# Patient Record
Sex: FEMALE | Race: BLACK OR AFRICAN AMERICAN | NOT HISPANIC OR LATINO | Employment: OTHER | URBAN - METROPOLITAN AREA
[De-identification: names, ages, dates, MRNs, and addresses within clinical notes are randomized per-mention and may not be internally consistent; named-entity substitution may affect disease eponyms.]

---

## 2023-12-29 ENCOUNTER — APPOINTMENT (OUTPATIENT)
Dept: RADIOLOGY | Facility: HOSPITAL | Age: 78
End: 2023-12-29
Payer: MEDICARE

## 2023-12-29 ENCOUNTER — HOSPITAL ENCOUNTER (EMERGENCY)
Facility: HOSPITAL | Age: 78
Discharge: HOME | End: 2023-12-29
Attending: EMERGENCY MEDICINE
Payer: MEDICARE

## 2023-12-29 ENCOUNTER — APPOINTMENT (OUTPATIENT)
Dept: CARDIOLOGY | Facility: HOSPITAL | Age: 78
End: 2023-12-29
Payer: MEDICARE

## 2023-12-29 VITALS
HEART RATE: 77 BPM | DIASTOLIC BLOOD PRESSURE: 62 MMHG | SYSTOLIC BLOOD PRESSURE: 109 MMHG | RESPIRATION RATE: 16 BRPM | OXYGEN SATURATION: 98 %

## 2023-12-29 DIAGNOSIS — R41.82 AMS (ALTERED MENTAL STATUS): Primary | ICD-10-CM

## 2023-12-29 DIAGNOSIS — R41.0 CONFUSION: ICD-10-CM

## 2023-12-29 LAB
ALBUMIN SERPL BCP-MCNC: 3.7 G/DL (ref 3.4–5)
ALP SERPL-CCNC: 79 U/L (ref 33–136)
ALT SERPL W P-5'-P-CCNC: 7 U/L (ref 7–45)
ANION GAP SERPL CALC-SCNC: 11 MMOL/L (ref 10–20)
AST SERPL W P-5'-P-CCNC: 17 U/L (ref 9–39)
ATRIAL RATE: 83 BPM
BASOPHILS # BLD AUTO: 0.01 X10*3/UL (ref 0–0.1)
BASOPHILS NFR BLD AUTO: 0.2 %
BILIRUB DIRECT SERPL-MCNC: 0.1 MG/DL (ref 0–0.3)
BILIRUB SERPL-MCNC: 0.5 MG/DL (ref 0–1.2)
BUN SERPL-MCNC: 19 MG/DL (ref 6–23)
CALCIUM SERPL-MCNC: 8.5 MG/DL (ref 8.6–10.3)
CHLORIDE SERPL-SCNC: 103 MMOL/L (ref 98–107)
CO2 SERPL-SCNC: 28 MMOL/L (ref 21–32)
CREAT SERPL-MCNC: 1.01 MG/DL (ref 0.5–1.05)
EOSINOPHIL # BLD AUTO: 0 X10*3/UL (ref 0–0.4)
EOSINOPHIL NFR BLD AUTO: 0 %
ERYTHROCYTE [DISTWIDTH] IN BLOOD BY AUTOMATED COUNT: 15.3 % (ref 11.5–14.5)
GFR SERPL CREATININE-BSD FRML MDRD: 57 ML/MIN/1.73M*2
GLUCOSE BLD MANUAL STRIP-MCNC: 107 MG/DL (ref 74–99)
GLUCOSE SERPL-MCNC: 107 MG/DL (ref 74–99)
HCT VFR BLD AUTO: 39.7 % (ref 36–46)
HGB BLD-MCNC: 12.6 G/DL (ref 12–16)
IMM GRANULOCYTES # BLD AUTO: 0.02 X10*3/UL (ref 0–0.5)
IMM GRANULOCYTES NFR BLD AUTO: 0.4 % (ref 0–0.9)
INR PPP: 1.1 (ref 0.9–1.1)
LYMPHOCYTES # BLD AUTO: 0.52 X10*3/UL (ref 0.8–3)
LYMPHOCYTES NFR BLD AUTO: 10 %
MCH RBC QN AUTO: 27 PG (ref 26–34)
MCHC RBC AUTO-ENTMCNC: 31.7 G/DL (ref 32–36)
MCV RBC AUTO: 85 FL (ref 80–100)
MONOCYTES # BLD AUTO: 0.7 X10*3/UL (ref 0.05–0.8)
MONOCYTES NFR BLD AUTO: 13.5 %
NEUTROPHILS # BLD AUTO: 3.94 X10*3/UL (ref 1.6–5.5)
NEUTROPHILS NFR BLD AUTO: 75.9 %
NRBC BLD-RTO: 0 /100 WBCS (ref 0–0)
P AXIS: 47 DEGREES
P OFFSET: 176 MS
P ONSET: 132 MS
PLATELET # BLD AUTO: 185 X10*3/UL (ref 150–450)
POTASSIUM SERPL-SCNC: 3.7 MMOL/L (ref 3.5–5.3)
PR INTERVAL: 174 MS
PROT SERPL-MCNC: 6.8 G/DL (ref 6.4–8.2)
PROTHROMBIN TIME: 12 SECONDS (ref 9.8–12.8)
Q ONSET: 219 MS
QRS COUNT: 14 BEATS
QRS DURATION: 72 MS
QT INTERVAL: 366 MS
QTC CALCULATION(BAZETT): 430 MS
QTC FREDERICIA: 408 MS
R AXIS: 20 DEGREES
RBC # BLD AUTO: 4.66 X10*6/UL (ref 4–5.2)
SODIUM SERPL-SCNC: 138 MMOL/L (ref 136–145)
T AXIS: 27 DEGREES
T OFFSET: 402 MS
VENTRICULAR RATE: 83 BPM
WBC # BLD AUTO: 5.2 X10*3/UL (ref 4.4–11.3)

## 2023-12-29 PROCEDURE — 80053 COMPREHEN METABOLIC PANEL: CPT | Performed by: EMERGENCY MEDICINE

## 2023-12-29 PROCEDURE — 82248 BILIRUBIN DIRECT: CPT | Performed by: EMERGENCY MEDICINE

## 2023-12-29 PROCEDURE — 82947 ASSAY GLUCOSE BLOOD QUANT: CPT | Mod: 59

## 2023-12-29 PROCEDURE — 99284 EMERGENCY DEPT VISIT MOD MDM: CPT | Performed by: EMERGENCY MEDICINE

## 2023-12-29 PROCEDURE — 70450 CT HEAD/BRAIN W/O DYE: CPT | Performed by: RADIOLOGY

## 2023-12-29 PROCEDURE — 70450 CT HEAD/BRAIN W/O DYE: CPT

## 2023-12-29 PROCEDURE — 85610 PROTHROMBIN TIME: CPT | Performed by: EMERGENCY MEDICINE

## 2023-12-29 PROCEDURE — 36415 COLL VENOUS BLD VENIPUNCTURE: CPT | Performed by: EMERGENCY MEDICINE

## 2023-12-29 PROCEDURE — 99285 EMERGENCY DEPT VISIT HI MDM: CPT | Mod: 25

## 2023-12-29 PROCEDURE — 93005 ELECTROCARDIOGRAM TRACING: CPT

## 2023-12-29 PROCEDURE — 85025 COMPLETE CBC W/AUTO DIFF WBC: CPT | Performed by: EMERGENCY MEDICINE

## 2023-12-29 ASSESSMENT — COLUMBIA-SUICIDE SEVERITY RATING SCALE - C-SSRS
6. HAVE YOU EVER DONE ANYTHING, STARTED TO DO ANYTHING, OR PREPARED TO DO ANYTHING TO END YOUR LIFE?: NO
1. IN THE PAST MONTH, HAVE YOU WISHED YOU WERE DEAD OR WISHED YOU COULD GO TO SLEEP AND NOT WAKE UP?: NO
2. HAVE YOU ACTUALLY HAD ANY THOUGHTS OF KILLING YOURSELF?: NO

## 2023-12-29 NOTE — ED TRIAGE NOTES
"Pt stay at a SNF, staffed noticed \"increased tremors.\" EMS noticed patient was confused and had difficulty following commands. Staff at SNF was unable to conclude a baseline mental status. EMS called stroke alert, last known well is unknown. No tremors noted upon arrival to ED.  "

## 2023-12-29 NOTE — ED PROVIDER NOTES
Chief Complaint   Patient presents with    confusion, tremors     History of Present Illness   Elizabeth Garcia   MRN [id number]    78-year-old presents for evaluation of increased tremors per EMS report.  Transported from [facility].  EMS reports that they received secondary handoff from initial responding EMS crew and did not speak with SNF staff however patient was noted to have increased tremors and crew could not rule out a stroke because patient did not participate fully in examination during transport.    Per staff at [facility], patient is oriented at baseline to person and staff was concerned that she appeared more fatigued than usual.    Patient reported no pain or other concerns.    Physical Exam     ED Triage Vitals   Temp Pulse Resp BP   -- -- -- --      SpO2 Temp src Heart Rate Source Patient Position   -- -- -- --      BP Location FiO2 (%)     -- --         General:  No acute distress.  Head: Atraumatic.  Eyes: No conjunctival injection.   Ears Nose Throat: Hearing grossly intact. No epistaxis. Oral mucosa moist.  Neck: Supple.  Cardiovascular: Extremities warm.   Pulmonary: No stridor. Normal respiratory effort.  Abdominal: No distention.  Soft and nontender.  Musculoskeletal: No deformity or joint swelling.  Skin: No rash.  Psychiatric: Does not appear to respond to internal stimuli.  Neurologic: Alert. Follows directions. Face symmetric. No aphasia or dysarthria. Symmetric movement of upper and lower extremities.    Interval: Baseline  Time: 2:36 PM  Person Administering Scale: Praful Brock MD     1a  Level of consciousness: 0=alert; keenly responsive   1b. LOC questions:  2=Performs neither task correctly   1c. LOC commands: 0=Performs both tasks correctly   2.  Best Gaze: 0=normal   3. Visual: 0=No visual loss   4. Facial Palsy: 0=Normal symmetric movement   5a. Motor left arm: 0=No drift, limb holds 90 (or 45) degrees for full 10 seconds   5b.  Motor right arm: 0=No drift, limb holds 90 (or 45) degrees for full  10 seconds   6a. motor left le=No drift, limb holds 90 (or 45) degrees for full 10 seconds   6b  Motor right le=No drift, limb holds 90 (or 45) degrees for full 10 seconds   7. Limb Ataxia: 0=Absent   8.  Sensory: 0=Normal; no sensory loss   9. Best Language:  0=No aphasia, normal   10. Dysarthria: 0=Normal   11. Extinction and Inattention: 0=No abnormality   12. Distal motor function: 0=Normal     Total:   2         ED Course & Medical Decision Making   Vital signs are reassuring.  Patient was alert and appeared comfortable and reported no complaints.  There was initial report from EMS of increased tremor however on initial assessment and on repeat assessments patient had no apparent tremor.  Patient's son arrived at bedside and confirmed that she was in her usual state of health and at her cognitive baseline.  CBC demonstrated no leukocytosis, anemia, or thrombocytopenia.  Metabolic panel and LFTs were unremarkable.  Patient and her son requested only that she be given something to eat and return to her facility.  Presentation not suggestive of acute ischemic stroke or acute alteration of mental status from patient's cognitive baseline.  Discharged in good condition.    ED Course as of 23 1436   Fri Dec 29, 2023   1026 ECG 12 lead  EKG interpreted by me.  2023 at 0912.  Sinus rhythm 83 bpm.   QRS 72 QTc 430.  No ST elevation. [MC]      ED Course User Index  [MC] Praful Brock MD         Diagnoses as of 23 143   Confusion            Praful Brock MD  23 143

## 2024-02-06 ENCOUNTER — APPOINTMENT (OUTPATIENT)
Dept: RADIOLOGY | Facility: HOSPITAL | Age: 79
End: 2024-02-06
Payer: MEDICARE

## 2024-02-06 ENCOUNTER — HOSPITAL ENCOUNTER (EMERGENCY)
Facility: HOSPITAL | Age: 79
Discharge: HOME | End: 2024-02-06
Attending: EMERGENCY MEDICINE
Payer: MEDICARE

## 2024-02-06 ENCOUNTER — APPOINTMENT (OUTPATIENT)
Dept: CARDIOLOGY | Facility: HOSPITAL | Age: 79
End: 2024-02-06
Payer: MEDICARE

## 2024-02-06 VITALS
SYSTOLIC BLOOD PRESSURE: 109 MMHG | HEART RATE: 62 BPM | RESPIRATION RATE: 18 BRPM | OXYGEN SATURATION: 100 % | DIASTOLIC BLOOD PRESSURE: 63 MMHG

## 2024-02-06 DIAGNOSIS — Z86.59 MENTAL STATUS CHANGE RESOLVED: ICD-10-CM

## 2024-02-06 DIAGNOSIS — N30.00 ACUTE CYSTITIS WITHOUT HEMATURIA: Primary | ICD-10-CM

## 2024-02-06 LAB
ALBUMIN SERPL BCP-MCNC: 3.2 G/DL (ref 3.4–5)
ALP SERPL-CCNC: 67 U/L (ref 33–136)
ALT SERPL W P-5'-P-CCNC: 5 U/L (ref 7–45)
AMPHETAMINES UR QL SCN: NORMAL
ANION GAP SERPL CALC-SCNC: 11 MMOL/L (ref 10–20)
APPEARANCE UR: CLEAR
APTT PPP: 31 SECONDS (ref 27–38)
AST SERPL W P-5'-P-CCNC: 13 U/L (ref 9–39)
BACTERIA #/AREA URNS AUTO: ABNORMAL /HPF
BARBITURATES UR QL SCN: NORMAL
BASOPHILS # BLD AUTO: 0.01 X10*3/UL (ref 0–0.1)
BASOPHILS NFR BLD AUTO: 0.2 %
BENZODIAZ UR QL SCN: NORMAL
BILIRUB SERPL-MCNC: 0.3 MG/DL (ref 0–1.2)
BILIRUB UR STRIP.AUTO-MCNC: NEGATIVE MG/DL
BUN SERPL-MCNC: 23 MG/DL (ref 6–23)
BZE UR QL SCN: NORMAL
CALCIUM SERPL-MCNC: 8.6 MG/DL (ref 8.6–10.3)
CANNABINOIDS UR QL SCN: NORMAL
CARDIAC TROPONIN I PNL SERPL HS: 4 NG/L (ref 0–13)
CHLORIDE SERPL-SCNC: 104 MMOL/L (ref 98–107)
CO2 SERPL-SCNC: 25 MMOL/L (ref 21–32)
COLOR UR: ABNORMAL
CREAT SERPL-MCNC: 0.78 MG/DL (ref 0.5–1.05)
EGFRCR SERPLBLD CKD-EPI 2021: 78 ML/MIN/1.73M*2
EOSINOPHIL # BLD AUTO: 0.08 X10*3/UL (ref 0–0.4)
EOSINOPHIL NFR BLD AUTO: 1.3 %
ERYTHROCYTE [DISTWIDTH] IN BLOOD BY AUTOMATED COUNT: 15.5 % (ref 11.5–14.5)
ETHANOL SERPL-MCNC: <10 MG/DL
FENTANYL+NORFENTANYL UR QL SCN: NORMAL
GLUCOSE BLD MANUAL STRIP-MCNC: 109 MG/DL (ref 74–99)
GLUCOSE SERPL-MCNC: 111 MG/DL (ref 74–99)
GLUCOSE UR STRIP.AUTO-MCNC: NEGATIVE MG/DL
HCT VFR BLD AUTO: 35.8 % (ref 36–46)
HGB BLD-MCNC: 11.1 G/DL (ref 12–16)
HOLD SPECIMEN: NORMAL
IMM GRANULOCYTES # BLD AUTO: 0.02 X10*3/UL (ref 0–0.5)
IMM GRANULOCYTES NFR BLD AUTO: 0.3 % (ref 0–0.9)
INR PPP: 1.1 (ref 0.9–1.1)
KETONES UR STRIP.AUTO-MCNC: NEGATIVE MG/DL
LEUKOCYTE ESTERASE UR QL STRIP.AUTO: ABNORMAL
LYMPHOCYTES # BLD AUTO: 1.58 X10*3/UL (ref 0.8–3)
LYMPHOCYTES NFR BLD AUTO: 25.8 %
MCH RBC QN AUTO: 26.8 PG (ref 26–34)
MCHC RBC AUTO-ENTMCNC: 31 G/DL (ref 32–36)
MCV RBC AUTO: 87 FL (ref 80–100)
MONOCYTES # BLD AUTO: 0.47 X10*3/UL (ref 0.05–0.8)
MONOCYTES NFR BLD AUTO: 7.7 %
MUCOUS THREADS #/AREA URNS AUTO: ABNORMAL /LPF
NEUTROPHILS # BLD AUTO: 3.96 X10*3/UL (ref 1.6–5.5)
NEUTROPHILS NFR BLD AUTO: 64.7 %
NITRITE UR QL STRIP.AUTO: POSITIVE
NRBC BLD-RTO: 0 /100 WBCS (ref 0–0)
OPIATES UR QL SCN: NORMAL
OXYCODONE+OXYMORPHONE UR QL SCN: NORMAL
PCP UR QL SCN: NORMAL
PH UR STRIP.AUTO: 7 [PH]
PLATELET # BLD AUTO: 190 X10*3/UL (ref 150–450)
POTASSIUM SERPL-SCNC: 3.9 MMOL/L (ref 3.5–5.3)
PROT SERPL-MCNC: 6.3 G/DL (ref 6.4–8.2)
PROT UR STRIP.AUTO-MCNC: NEGATIVE MG/DL
PROTHROMBIN TIME: 11.9 SECONDS (ref 9.8–12.8)
RBC # BLD AUTO: 4.14 X10*6/UL (ref 4–5.2)
RBC # UR STRIP.AUTO: NEGATIVE /UL
RBC #/AREA URNS AUTO: ABNORMAL /HPF
SODIUM SERPL-SCNC: 136 MMOL/L (ref 136–145)
SP GR UR STRIP.AUTO: 1.04
SQUAMOUS #/AREA URNS AUTO: ABNORMAL /HPF
UROBILINOGEN UR STRIP.AUTO-MCNC: <2 MG/DL
WBC # BLD AUTO: 6.1 X10*3/UL (ref 4.4–11.3)
WBC #/AREA URNS AUTO: ABNORMAL /HPF

## 2024-02-06 PROCEDURE — 80307 DRUG TEST PRSMV CHEM ANLYZR: CPT | Performed by: EMERGENCY MEDICINE

## 2024-02-06 PROCEDURE — 70498 CT ANGIOGRAPHY NECK: CPT | Performed by: RADIOLOGY

## 2024-02-06 PROCEDURE — 36415 COLL VENOUS BLD VENIPUNCTURE: CPT | Performed by: EMERGENCY MEDICINE

## 2024-02-06 PROCEDURE — 70496 CT ANGIOGRAPHY HEAD: CPT

## 2024-02-06 PROCEDURE — 2500000004 HC RX 250 GENERAL PHARMACY W/ HCPCS (ALT 636 FOR OP/ED): Performed by: EMERGENCY MEDICINE

## 2024-02-06 PROCEDURE — 82077 ASSAY SPEC XCP UR&BREATH IA: CPT | Performed by: EMERGENCY MEDICINE

## 2024-02-06 PROCEDURE — 84484 ASSAY OF TROPONIN QUANT: CPT | Performed by: EMERGENCY MEDICINE

## 2024-02-06 PROCEDURE — 85025 COMPLETE CBC W/AUTO DIFF WBC: CPT | Performed by: EMERGENCY MEDICINE

## 2024-02-06 PROCEDURE — 71045 X-RAY EXAM CHEST 1 VIEW: CPT

## 2024-02-06 PROCEDURE — 2550000001 HC RX 255 CONTRASTS: Performed by: EMERGENCY MEDICINE

## 2024-02-06 PROCEDURE — 81001 URINALYSIS AUTO W/SCOPE: CPT | Mod: 59 | Performed by: EMERGENCY MEDICINE

## 2024-02-06 PROCEDURE — 93005 ELECTROCARDIOGRAM TRACING: CPT

## 2024-02-06 PROCEDURE — 70498 CT ANGIOGRAPHY NECK: CPT

## 2024-02-06 PROCEDURE — 70496 CT ANGIOGRAPHY HEAD: CPT | Performed by: RADIOLOGY

## 2024-02-06 PROCEDURE — 80053 COMPREHEN METABOLIC PANEL: CPT | Performed by: EMERGENCY MEDICINE

## 2024-02-06 PROCEDURE — 85610 PROTHROMBIN TIME: CPT | Performed by: EMERGENCY MEDICINE

## 2024-02-06 PROCEDURE — 99285 EMERGENCY DEPT VISIT HI MDM: CPT | Mod: 25 | Performed by: EMERGENCY MEDICINE

## 2024-02-06 PROCEDURE — 82947 ASSAY GLUCOSE BLOOD QUANT: CPT | Mod: 59

## 2024-02-06 PROCEDURE — 87086 URINE CULTURE/COLONY COUNT: CPT | Mod: AHULAB | Performed by: EMERGENCY MEDICINE

## 2024-02-06 PROCEDURE — 70450 CT HEAD/BRAIN W/O DYE: CPT | Performed by: RADIOLOGY

## 2024-02-06 PROCEDURE — 70450 CT HEAD/BRAIN W/O DYE: CPT | Mod: 59

## 2024-02-06 PROCEDURE — 71045 X-RAY EXAM CHEST 1 VIEW: CPT | Mod: FOREIGN READ | Performed by: RADIOLOGY

## 2024-02-06 PROCEDURE — 85730 THROMBOPLASTIN TIME PARTIAL: CPT | Performed by: EMERGENCY MEDICINE

## 2024-02-06 RX ORDER — SULFAMETHOXAZOLE AND TRIMETHOPRIM 800; 160 MG/1; MG/1
1 TABLET ORAL ONCE
Status: COMPLETED | OUTPATIENT
Start: 2024-02-06 | End: 2024-02-06

## 2024-02-06 RX ORDER — SULFAMETHOXAZOLE AND TRIMETHOPRIM 800; 160 MG/1; MG/1
1 TABLET ORAL 2 TIMES DAILY
Qty: 14 TABLET | Refills: 0 | Status: SHIPPED | OUTPATIENT
Start: 2024-02-06 | End: 2024-02-13

## 2024-02-06 RX ADMIN — IOHEXOL 75 ML: 350 INJECTION, SOLUTION INTRAVENOUS at 08:42

## 2024-02-06 RX ADMIN — SULFAMETHOXAZOLE AND TRIMETHOPRIM 1 TABLET: 800; 160 TABLET ORAL at 11:49

## 2024-02-06 ASSESSMENT — ACTIVITIES OF DAILY LIVING (ADL): LACK_OF_TRANSPORTATION: NO

## 2024-02-06 NOTE — PROGRESS NOTES
02/06/24 0843   [facility] Disability Status   Are you deaf or do you have serious difficulty hearing? N   Are you blind or do you have serious difficulty seeing, even when wearing glasses? N   Because of a physical, mental, or emotional condition, do you have serious difficulty concentrating, remembering, or making decisions? (5 years old or older) Y  (dementia)   Do you have serious difficulty walking or climbing stairs? Y   Do you have serious difficulty dressing or bathing? Y   Because of a physical, mental, or emotional condition, do you have serious difficulty doing errands alone such as visiting the doctor? Y

## 2024-02-06 NOTE — PROGRESS NOTES
Transitional Care Coordination Progress Note:  Plan per Medical/Surgical team: treatment of change in mental status with CTSCANs pending  Status: ED  Payor source: Erin Hopkinsregor care coordinator Christal Laura [phone], fax #[phone]    [facility]charge disposition: [facility]  [address] [facility] [id number] [phone]  Potential Barriers: dementia  ADOD: 2/6/2024  JOSE Luo RN, BSN Transitional Care Coordinator ED# [phone]      02/06/24 0844   Discharge Planning   Living Arrangements Alone   Support Systems Children   Assistance Needed CTSCANs of brain pending   Type of Residence Nursing home/residential care   Do you have animals or pets at home? No   Home or Post Acute Services Post acute facilities (Rehab/SNF/etc)   Type of Post Acute Facility Services Long term care   Patient [facility] to be discharged to: [facility]  [address] [facility] [id number] [phone]   Does the patient need discharge transport arranged? Yes   RoundTrip coordination needed? Yes   Has discharge transport been arranged? No   Financial Resource Strain   How hard is it for you to pay for the very basics like food, housing, medical care, and heating? Not hard   Housing Stability   In the last 12 months, was there a time when you were not able to pay the mortgage or rent on time? N   In the last 12 months, how many places have you lived? 2   In the last 12 months, was there a time when you did not have a steady place to sleep or slept in a shelter (including now)? N   Transportation Needs   In the past 12 months, has lack of transportation kept you from medical appointments or from getting medications? no   In the past 12 months, has lack of transportation kept you from meetings, work, or from getting things needed for daily living? No   Patient Choice   Provider Choice list and CMS website (https://medicare.gov/care-compare#search) for  post-acute Quality and Resource Measure Data were provided and reviewed with: Patient;Family   Patient / Family choosing to utilize agency / facility established prior to hospitalization Yes

## 2024-02-06 NOTE — ED PROVIDER NOTES
T    Time initially seen in ED: 0805      History/Exam limitations: mental status.   Additional history was obtained from relative(s).    HPI:       Elizabeth Garcia is a 78 y.o. with a history of dementia.  78-year-old, dementia unit, son gives majority of history.  Around 730 last known well, apparently screamed out, then had more difficulty verbalizing and communicating.  [place] stroke L- by EMS brought to ED for evaluation.  No lateralizing findings, just not speaking as well or following complete commands.  Son believes may be related to possibly hangover, she did have some wine last night with him at the facility.  Patient had no particular complaints otherwise.  Again no new lateralizing issues.  No injury or trauma.    Besides potentially mild dementia, but son states she has no other past medical problems does not take any prescription medications.     Last Known Well Time: 0730        ------------------------------------------------------------------------------------------------------------------------------------------     Physical Exam:    ED Triage Vitals   Temp Pulse Resp BP   -- -- -- --      SpO2 Temp src Heart Rate Source Patient Position   -- -- -- --      BP Location FiO2 (%)     -- --          Gen: Not in acute distress  Head/Neck: NCAT  Eyes: Anicteric sclerae, noninjected conjunctivae  Nose: No rhinorrhea  Mouth:  MMM  Heart: Regular rate and rhythm w/ no murmurs, rubs, gallops  Lungs: CTAB w/o wheezes, rales, rhonchi, no increased work of breathing  Abdomen: Soft, NT/ND  Musculoskeletal: No deformities  Extremities: No edema.  Neurologic: Please see below for NIHSS  Skin: No rashes noted  Psychological: Calm        Interval: Baseline  Time: 12:18 PM  Person Administering Scale: eHrnando Morataya MD MPH     1a  Level of consciousness: 1=not alert but arousable by minor stimulation to obey, answer or respond   1b. LOC questions:  1=Performs one task correctly   1c. LOC commands: 0=Performs both  tasks correctly   2.  Best Gaze: 0=normal   3. Visual: 0=No visual loss   4. Facial Palsy: 0=Normal symmetric movement   5a. Motor left arm: 0=No drift, limb holds 90 (or 45) degrees for full 10 seconds   5b.  Motor right arm: 0=No drift, limb holds 90 (or 45) degrees for full 10 seconds   6a. motor left le=No drift, limb holds 90 (or 45) degrees for full 10 seconds   6b  Motor right le=No drift, limb holds 90 (or 45) degrees for full 10 seconds   7. Limb Ataxia: 0=Absent   8.  Sensory: 0=Normal; no sensory loss   9. Best Language:  1=Mild to moderate aphasia; some obvious loss of fluency or facility of comprehension without significant limitation on ideas expressed or form of expression.   10. Dysarthria: 0=Normal   11. Extinction and Inattention: 0=No abnormality   12. Distal motor function: 0=Normal     Total:   1         VAN: VAN: Negative     ------------------------------------------------------------------------------------------------------------------------------------------     Medical Decision Making:     ED Course as of 24      0848 ECG 12 lead  Sinus rhythm.  81 bpm.  Normal rate rhythm and axis.  Moderate to significant artifact but no acute ischemic findings. [KS]      ED Course User Index  [KS] Hernando Morataya MD MPH         Diagnoses as of 24   Acute cystitis without hematuria   Mental status change resolved        EKG interpreted by myself (ED attending physician): See ED course     Independent Interpretation of Studies: I independently interpreted the CT head and see No obvious evidence of intracranial hemorrhage  CT angiograms negative as well.      Patient essentially at baseline according to family members.    Reassessment.  Patient up ambulatory.  Family at bedside states this is her normal self.  They have no concerns about her mental status.    Patient's urine does show UTI small UTI.  Able to tolerate Bactrim p.o. while in the ED.  Will  discharge on Bactrim x 7 days.  Family comfortable taking back to facility at this time.  Again she is at baseline.  Up ambulatory normally conversant for her dementia status.      Chronic Medical Conditions Significantly Affecting Care: Dementia    Social Determinants of Health Significantly Affecting Care:        External Records Reviewed: I reviewed recent and relevant outside records including:      Discussion of Management with Other Providers:   I discussed the patient/results with:       IV Thrombolysis: No thrombolysis.  Working diagnosis not CVA.        Procedure  Procedures          Hernando Morataya MD MPH  02/06/24 5339

## 2024-02-06 NOTE — PROGRESS NOTES
[facility]  [address] [address] [facility] [address]   [phone]     02/06/24 0843   Current Planned Discharge Disposition   Current Planned Discharge Disposition SNF

## 2024-02-07 LAB
ATRIAL RATE: 81 BPM
P AXIS: 43 DEGREES
P OFFSET: 167 MS
P ONSET: 123 MS
PR INTERVAL: 198 MS
Q ONSET: 222 MS
QRS COUNT: 13 BEATS
QRS DURATION: 72 MS
QT INTERVAL: 374 MS
QTC CALCULATION(BAZETT): 434 MS
QTC FREDERICIA: 413 MS
R AXIS: 26 DEGREES
T AXIS: 51 DEGREES
T OFFSET: 409 MS
VENTRICULAR RATE: 81 BPM

## 2024-02-09 LAB — BACTERIA UR CULT: ABNORMAL

## 2024-02-10 ENCOUNTER — TELEPHONE (OUTPATIENT)
Dept: PHARMACY | Facility: HOSPITAL | Age: 79
End: 2024-02-10
Payer: MEDICARE

## 2024-02-10 NOTE — PROGRESS NOTES
EDPD Note: Lab/Chart Reviewed    Reviewed Ms. Elizabeth Garcia 's chart regarding a positive urine culture that was taken during their recent emergency room visit. The patient was transferred back to their rehab/LTC facility .Therefore, I [facility] fax number [phone] .    Susceptibility data from last 90 days.  Collected Specimen Info Organism Amoxicillin/Clavulanate Ampicillin Ampicillin/Sulbactam Cefazolin Cefazolin (uncomplicated UTIs only) Ciprofloxacin Gentamicin Nitrofurantoin Piperacillin/Tazobactam Trimethoprim/Sulfamethoxazole   02/06/24 Urine from Clean Catch/Voided Escherichia coli S R S S S S S S S R       No further follow up needed from EDPD Team.     Christy Sanders, PharmD

## 2024-05-02 ENCOUNTER — HOSPITAL ENCOUNTER (INPATIENT)
Facility: HOSPITAL | Age: 79
LOS: 2 days | Discharge: NURSING, REHAB OR LONG-TERM CARE | DRG: 689 | End: 2024-05-04
Attending: EMERGENCY MEDICINE | Admitting: FAMILY MEDICINE
Payer: MEDICARE

## 2024-05-02 ENCOUNTER — APPOINTMENT (OUTPATIENT)
Dept: RADIOLOGY | Facility: HOSPITAL | Age: 79
DRG: 689 | End: 2024-05-02
Payer: MEDICARE

## 2024-05-02 ENCOUNTER — APPOINTMENT (OUTPATIENT)
Dept: CARDIOLOGY | Facility: HOSPITAL | Age: 79
DRG: 689 | End: 2024-05-02
Payer: MEDICARE

## 2024-05-02 DIAGNOSIS — N30.90 CYSTITIS: ICD-10-CM

## 2024-05-02 DIAGNOSIS — N39.0 URINARY TRACT INFECTION WITHOUT HEMATURIA, SITE UNSPECIFIED: Primary | ICD-10-CM

## 2024-05-02 DIAGNOSIS — R40.4 UNRESPONSIVE EPISODE: ICD-10-CM

## 2024-05-02 LAB
ALBUMIN SERPL BCP-MCNC: 4 G/DL (ref 3.4–5)
ALP SERPL-CCNC: 83 U/L (ref 33–136)
ALT SERPL W P-5'-P-CCNC: 7 U/L (ref 7–45)
ANION GAP SERPL CALC-SCNC: 15 MMOL/L (ref 10–20)
APPEARANCE UR: ABNORMAL
APTT PPP: 32 SECONDS (ref 27–38)
AST SERPL W P-5'-P-CCNC: 15 U/L (ref 9–39)
ATRIAL RATE: 71 BPM
BACTERIA #/AREA URNS AUTO: ABNORMAL /HPF
BASOPHILS # BLD AUTO: 0.01 X10*3/UL (ref 0–0.1)
BASOPHILS NFR BLD AUTO: 0.2 %
BILIRUB SERPL-MCNC: 0.4 MG/DL (ref 0–1.2)
BILIRUB UR STRIP.AUTO-MCNC: NEGATIVE MG/DL
BUN SERPL-MCNC: 26 MG/DL (ref 6–23)
CALCIUM SERPL-MCNC: 8.8 MG/DL (ref 8.6–10.3)
CARDIAC TROPONIN I PNL SERPL HS: 4 NG/L (ref 0–13)
CHLORIDE SERPL-SCNC: 104 MMOL/L (ref 98–107)
CO2 SERPL-SCNC: 24 MMOL/L (ref 21–32)
COLOR UR: ABNORMAL
CREAT SERPL-MCNC: 0.85 MG/DL (ref 0.5–1.05)
EGFRCR SERPLBLD CKD-EPI 2021: 71 ML/MIN/1.73M*2
EOSINOPHIL # BLD AUTO: 0.08 X10*3/UL (ref 0–0.4)
EOSINOPHIL NFR BLD AUTO: 1.9 %
ERYTHROCYTE [DISTWIDTH] IN BLOOD BY AUTOMATED COUNT: 14.5 % (ref 11.5–14.5)
GLUCOSE SERPL-MCNC: 93 MG/DL (ref 74–99)
GLUCOSE UR STRIP.AUTO-MCNC: NORMAL MG/DL
HCT VFR BLD AUTO: 38.9 % (ref 36–46)
HGB BLD-MCNC: 12.4 G/DL (ref 12–16)
IMM GRANULOCYTES # BLD AUTO: 0.01 X10*3/UL (ref 0–0.5)
IMM GRANULOCYTES NFR BLD AUTO: 0.2 % (ref 0–0.9)
INR PPP: 1.1 (ref 0.9–1.1)
KETONES UR STRIP.AUTO-MCNC: ABNORMAL MG/DL
LACTATE SERPL-SCNC: 0.9 MMOL/L (ref 0.4–2)
LACTATE SERPL-SCNC: 3.6 MMOL/L (ref 0.4–2)
LEUKOCYTE ESTERASE UR QL STRIP.AUTO: ABNORMAL
LYMPHOCYTES # BLD AUTO: 1.64 X10*3/UL (ref 0.8–3)
LYMPHOCYTES NFR BLD AUTO: 38.3 %
MAGNESIUM SERPL-MCNC: 2.1 MG/DL (ref 1.6–2.4)
MCH RBC QN AUTO: 27.4 PG (ref 26–34)
MCHC RBC AUTO-ENTMCNC: 31.9 G/DL (ref 32–36)
MCV RBC AUTO: 86 FL (ref 80–100)
MONOCYTES # BLD AUTO: 0.28 X10*3/UL (ref 0.05–0.8)
MONOCYTES NFR BLD AUTO: 6.5 %
MUCOUS THREADS #/AREA URNS AUTO: ABNORMAL /LPF
NEUTROPHILS # BLD AUTO: 2.26 X10*3/UL (ref 1.6–5.5)
NEUTROPHILS NFR BLD AUTO: 52.9 %
NITRITE UR QL STRIP.AUTO: ABNORMAL
NRBC BLD-RTO: 0 /100 WBCS (ref 0–0)
P AXIS: 51 DEGREES
P OFFSET: 170 MS
P ONSET: 116 MS
PH UR STRIP.AUTO: 6.5 [PH]
PLATELET # BLD AUTO: 191 X10*3/UL (ref 150–450)
POTASSIUM SERPL-SCNC: 3.9 MMOL/L (ref 3.5–5.3)
PR INTERVAL: 202 MS
PROT SERPL-MCNC: 7.1 G/DL (ref 6.4–8.2)
PROT UR STRIP.AUTO-MCNC: NEGATIVE MG/DL
PROTHROMBIN TIME: 12 SECONDS (ref 9.8–12.8)
Q ONSET: 217 MS
QRS COUNT: 12 BEATS
QRS DURATION: 80 MS
QT INTERVAL: 406 MS
QTC CALCULATION(BAZETT): 441 MS
QTC FREDERICIA: 429 MS
R AXIS: 30 DEGREES
RBC # BLD AUTO: 4.53 X10*6/UL (ref 4–5.2)
RBC # UR STRIP.AUTO: ABNORMAL /UL
RBC #/AREA URNS AUTO: ABNORMAL /HPF
SODIUM SERPL-SCNC: 139 MMOL/L (ref 136–145)
SP GR UR STRIP.AUTO: 1.02
T AXIS: 69 DEGREES
T OFFSET: 420 MS
T4 FREE SERPL-MCNC: 0.84 NG/DL (ref 0.61–1.12)
TSH SERPL-ACNC: 7.52 MIU/L (ref 0.44–3.98)
UROBILINOGEN UR STRIP.AUTO-MCNC: NORMAL MG/DL
VENTRICULAR RATE: 71 BPM
WBC # BLD AUTO: 4.3 X10*3/UL (ref 4.4–11.3)
WBC #/AREA URNS AUTO: >50 /HPF

## 2024-05-02 PROCEDURE — 71045 X-RAY EXAM CHEST 1 VIEW: CPT

## 2024-05-02 PROCEDURE — 84439 ASSAY OF FREE THYROXINE: CPT

## 2024-05-02 PROCEDURE — 2500000004 HC RX 250 GENERAL PHARMACY W/ HCPCS (ALT 636 FOR OP/ED): Performed by: EMERGENCY MEDICINE

## 2024-05-02 PROCEDURE — 81001 URINALYSIS AUTO W/SCOPE: CPT

## 2024-05-02 PROCEDURE — 36415 COLL VENOUS BLD VENIPUNCTURE: CPT

## 2024-05-02 PROCEDURE — 93005 ELECTROCARDIOGRAM TRACING: CPT

## 2024-05-02 PROCEDURE — 70450 CT HEAD/BRAIN W/O DYE: CPT | Performed by: RADIOLOGY

## 2024-05-02 PROCEDURE — 2500000004 HC RX 250 GENERAL PHARMACY W/ HCPCS (ALT 636 FOR OP/ED)

## 2024-05-02 PROCEDURE — 96365 THER/PROPH/DIAG IV INF INIT: CPT

## 2024-05-02 PROCEDURE — 96361 HYDRATE IV INFUSION ADD-ON: CPT

## 2024-05-02 PROCEDURE — 70450 CT HEAD/BRAIN W/O DYE: CPT

## 2024-05-02 PROCEDURE — 2500000004 HC RX 250 GENERAL PHARMACY W/ HCPCS (ALT 636 FOR OP/ED): Performed by: FAMILY MEDICINE

## 2024-05-02 PROCEDURE — 1200000002 HC GENERAL ROOM WITH TELEMETRY DAILY

## 2024-05-02 PROCEDURE — 84484 ASSAY OF TROPONIN QUANT: CPT

## 2024-05-02 PROCEDURE — 85610 PROTHROMBIN TIME: CPT

## 2024-05-02 PROCEDURE — 83605 ASSAY OF LACTIC ACID: CPT

## 2024-05-02 PROCEDURE — 83735 ASSAY OF MAGNESIUM: CPT

## 2024-05-02 PROCEDURE — 99285 EMERGENCY DEPT VISIT HI MDM: CPT | Mod: 25

## 2024-05-02 PROCEDURE — 84075 ASSAY ALKALINE PHOSPHATASE: CPT

## 2024-05-02 PROCEDURE — 84443 ASSAY THYROID STIM HORMONE: CPT

## 2024-05-02 PROCEDURE — 85025 COMPLETE CBC W/AUTO DIFF WBC: CPT

## 2024-05-02 PROCEDURE — 71045 X-RAY EXAM CHEST 1 VIEW: CPT | Mod: FOREIGN READ | Performed by: RADIOLOGY

## 2024-05-02 RX ORDER — TALC
3 POWDER (GRAM) TOPICAL NIGHTLY PRN
Status: DISCONTINUED | OUTPATIENT
Start: 2024-05-02 | End: 2024-05-04 | Stop reason: HOSPADM

## 2024-05-02 RX ORDER — ACETAMINOPHEN 325 MG/1
650 TABLET ORAL EVERY 4 HOURS PRN
Status: DISCONTINUED | OUTPATIENT
Start: 2024-05-02 | End: 2024-05-04 | Stop reason: HOSPADM

## 2024-05-02 RX ORDER — CEFTRIAXONE 1 G/50ML
1 INJECTION, SOLUTION INTRAVENOUS ONCE
Status: COMPLETED | OUTPATIENT
Start: 2024-05-02 | End: 2024-05-02

## 2024-05-02 RX ORDER — GUAIFENESIN 600 MG/1
600 TABLET, EXTENDED RELEASE ORAL EVERY 12 HOURS PRN
Status: DISCONTINUED | OUTPATIENT
Start: 2024-05-02 | End: 2024-05-04 | Stop reason: HOSPADM

## 2024-05-02 RX ORDER — ENOXAPARIN SODIUM 100 MG/ML
40 INJECTION SUBCUTANEOUS EVERY 24 HOURS
Status: DISCONTINUED | OUTPATIENT
Start: 2024-05-02 | End: 2024-05-04 | Stop reason: HOSPADM

## 2024-05-02 RX ORDER — POLYETHYLENE GLYCOL 3350 17 G/17G
17 POWDER, FOR SOLUTION ORAL DAILY
Status: DISCONTINUED | OUTPATIENT
Start: 2024-05-03 | End: 2024-05-04 | Stop reason: HOSPADM

## 2024-05-02 RX ORDER — ACETAMINOPHEN 650 MG/1
650 SUPPOSITORY RECTAL EVERY 4 HOURS PRN
Status: DISCONTINUED | OUTPATIENT
Start: 2024-05-02 | End: 2024-05-04 | Stop reason: HOSPADM

## 2024-05-02 RX ORDER — ONDANSETRON HYDROCHLORIDE 2 MG/ML
4 INJECTION, SOLUTION INTRAVENOUS EVERY 8 HOURS PRN
Status: DISCONTINUED | OUTPATIENT
Start: 2024-05-02 | End: 2024-05-04 | Stop reason: HOSPADM

## 2024-05-02 RX ORDER — ACETAMINOPHEN 160 MG/5ML
650 SOLUTION ORAL EVERY 4 HOURS PRN
Status: DISCONTINUED | OUTPATIENT
Start: 2024-05-02 | End: 2024-05-04 | Stop reason: HOSPADM

## 2024-05-02 RX ORDER — ONDANSETRON 4 MG/1
4 TABLET, FILM COATED ORAL EVERY 8 HOURS PRN
Status: DISCONTINUED | OUTPATIENT
Start: 2024-05-02 | End: 2024-05-04 | Stop reason: HOSPADM

## 2024-05-02 RX ADMIN — CEFTRIAXONE SODIUM 1 G: 1 INJECTION, SOLUTION INTRAVENOUS at 16:25

## 2024-05-02 RX ADMIN — ENOXAPARIN SODIUM 40 MG: 40 INJECTION SUBCUTANEOUS at 23:46

## 2024-05-02 RX ADMIN — SODIUM CHLORIDE, SODIUM LACTATE, POTASSIUM CHLORIDE, AND CALCIUM CHLORIDE 1000 ML: 600; 310; 30; 20 INJECTION, SOLUTION INTRAVENOUS at 15:30

## 2024-05-02 ASSESSMENT — COLUMBIA-SUICIDE SEVERITY RATING SCALE - C-SSRS
1. IN THE PAST MONTH, HAVE YOU WISHED YOU WERE DEAD OR WISHED YOU COULD GO TO SLEEP AND NOT WAKE UP?: NO
6. HAVE YOU EVER DONE ANYTHING, STARTED TO DO ANYTHING, OR PREPARED TO DO ANYTHING TO END YOUR LIFE?: NO
2. HAVE YOU ACTUALLY HAD ANY THOUGHTS OF KILLING YOURSELF?: NO

## 2024-05-02 ASSESSMENT — COGNITIVE AND FUNCTIONAL STATUS - GENERAL
MOBILITY SCORE: 8
CLIMB 3 TO 5 STEPS WITH RAILING: TOTAL
DRESSING REGULAR LOWER BODY CLOTHING: TOTAL
PERSONAL GROOMING: TOTAL
DAILY ACTIVITIY SCORE: 7
MOVING TO AND FROM BED TO CHAIR: TOTAL
WALKING IN HOSPITAL ROOM: TOTAL
HELP NEEDED FOR BATHING: TOTAL
MOVING FROM LYING ON BACK TO SITTING ON SIDE OF FLAT BED WITH BEDRAILS: A LOT
TOILETING: TOTAL
DRESSING REGULAR UPPER BODY CLOTHING: TOTAL
TURNING FROM BACK TO SIDE WHILE IN FLAT BAD: A LOT
EATING MEALS: A LOT
STANDING UP FROM CHAIR USING ARMS: TOTAL

## 2024-05-02 ASSESSMENT — ACTIVITIES OF DAILY LIVING (ADL): LACK_OF_TRANSPORTATION: NO

## 2024-05-02 NOTE — ED TRIAGE NOTES
Pt arrived to ED with c/o seizure like activity at [facility]) that lasted approximately 10-15 seconds. EMS reports that pt is A+0 x 1 at baseline. Pt was not verbally answering questions upon arrival but began to shortly after. EMS reports that pt had 1 prior complaint that was similar and was dx with UTI at that time.

## 2024-05-02 NOTE — PROGRESS NOTES
Transitional Care Coordination Progress Note:  Plan per Medical/Surgical team: treatment of seizure like activity with CTSCANs pending  Status: ED  Payor source: Erin Hopkinsregor care coordinator Christal Laura [phone], fax #[phone]    Discharge disposition: [facility]  [phone]  Potential Barriers: dementia  ADOD: 5/4/2024  L Pam Luo RN, BSN Transitional Care Coordinator ED# [phone]      05/02/24 1325   Discharge Planning   Living Arrangements Alone   Support Systems Children   Assistance Needed seizure precautions   Type of Residence Nursing home/residential care   Do you have animals or pets at home? No   Home or Post Acute Services Post acute facilities (Rehab/SNF/etc)   Type of Post Acute Facility Services Long term care   Patient expects to be discharged to: [facility] [phone]   Does the patient need discharge transport arranged? Yes   RoundTrip coordination needed? Yes   Has discharge transport been arranged? No   Financial Resource Strain   How hard is it for you to pay for the very basics like food, housing, medical care, and heating? Not hard   Housing Stability   In the last 12 months, was there a time when you were not able to pay the mortgage or rent on time? N   In the last 12 months, how many places have you lived? 1   In the last 12 months, was there a time when you did not have a steady place to sleep or slept in a shelter (including now)? N   Transportation Needs   In the past 12 months, has lack of transportation kept you from medical appointments or from getting medications? no   In the past 12 months, has lack of transportation kept you from meetings, work, or from getting things needed for daily living? No

## 2024-05-02 NOTE — ED PROVIDER NOTES
HPI   Chief Complaint   Patient presents with    Seizures       HPI  78-year-old with past medical history of dementia A&O x 1 at baseline who presents to the emergency department from facility for seizure-like activity.  Per facility, seizure like activity lasted about 10 to 15 seconds, had a similar occurrence 2/2024 for UTI.  History limited due to patient's baseline mental status.  When questioning, patient states just a little with every question.    Per prior visit, apparently screamed out, then had more difficulty verbalizing and communicating. Cth and Cta at time were negative.      Today, patient is verbal and able to follow commands, moves all extremities. Patient is alert and oriented to person but not place, year.              No data recorded                   Patient History   Past Medical History:   Diagnosis Date    Personal history of other specified conditions 03/18/2015    History of chest pain     No past surgical history on file.  No family history on file.  Social History     Tobacco Use    Smoking status: Unknown    Smokeless tobacco: Not on file   Substance Use Topics    Alcohol use: Not on file    Drug use: Not on file       Physical Exam   ED Triage Vitals [05/02/24 1005]   Temperature Heart Rate Respirations BP   36.4 °C (97.5 °F) 67 17 (!) 114/93      Pulse Ox Temp Source Heart Rate Source Patient Position   98 % Oral Monitor --      BP Location FiO2 (%)     Left arm --       Physical Exam  Vitals and nursing note reviewed.   Constitutional:       General: She is not in acute distress.     Appearance: She is well-developed.   HENT:      Head: Normocephalic and atraumatic.   Eyes:      Extraocular Movements: Extraocular movements intact.      Conjunctiva/sclera: Conjunctivae normal.   Cardiovascular:      Rate and Rhythm: Normal rate and regular rhythm.      Heart sounds: Normal heart sounds. No murmur heard.  Pulmonary:      Effort: Pulmonary effort is normal. No respiratory distress.       Breath sounds: Normal breath sounds.   Abdominal:      General: Abdomen is flat.      Palpations: Abdomen is soft.      Tenderness: There is no abdominal tenderness.   Musculoskeletal:         General: No swelling.      Right lower leg: No edema.      Left lower leg: No edema.   Skin:     General: Skin is dry.      Capillary Refill: Capillary refill takes less than 2 seconds.   Neurological:      Mental Status: She is alert.      Comments: patient is verbal and able to follow commands, moves all extremities. Patient is alert and oriented to person but not place, year. Speech is clear. Face is symmetric without facial droop. Hearing intact bilaterally.     Psychiatric:         Mood and Affect: Mood normal.         ED Course & MDM   ED Course as of 05/02/24 2039   Thu May 02, 2024   1039 EKG independently interpreted by me and shows normal sinus rhythm, rate 71, , QRS 80, QTc 441. [AT]   1041 Chest x-ray independently interpreted by me shows shows no hemothorax, pneumothorax, no focal consolidation concerning for pneumonia. [AT]   1330 Cth negative, no obvious sah ich mass [AT]   1505 CT h negative [AT]      ED Course User Index  [AT] Shandra Brown MD         Diagnoses as of 05/02/24 2039   Urinary tract infection without hematuria, site unspecified   Unresponsive episode   Cystitis       Medical Decision Making  Differential includes but is not limited to SAH, ICH, mass, meningitis, seizure, syncope, UTI, hypoglycemia, thyroid disorder, electrolyte abnormality.     CT head ordered to rule out SAH, ICH, mass. No neck rigidity for concern for meningitis. Patient without lateralizing neurological deficit to suggest ischemic CVA. UA ordered to rule out UTI. Less likely med or substance induced as no history of and lack of past substance use. Labs ordered to rule out hypoglycemia, thyroid disorder, electrolyte abnormality. Chest xray to rule out pneumonia, pneumothorax, hemothorax.  TSH elevated however free T4  normal.  Troponin negative less likely ACS.  Lactate elevated.  Magnesium normal.  CMP at unremarkable.  Less likely electrolyte abnormalities.  CBC within normal limits.  Coag screen normal.  UA shows concerns for UTI.  Patient given prescription for abx for UTI.  Prior urine culture shows resistance to ampicillin and TMP-SMX.  Given IV ceftriaxone. Repeat lactate normal. Discussed admission with family. Patient admitted for uti and seizurelike episode/ episode of unresponsiveness. Admitted in stable condition.      Procedure  Procedures            Shandra Brown MD  Resident  05/02/24 3017       Alli Shukla MD  05/02/24 9087

## 2024-05-02 NOTE — PROGRESS NOTES
05/02/24 1325   [facility] Disability Status   Are you deaf or do you have serious difficulty hearing? N   Are you blind or do you have serious difficulty seeing, even when wearing glasses? N   Because of a physical, mental, or emotional condition, do you have serious difficulty concentrating, remembering, or making decisions? (5 years old or older) Y   Do you have serious difficulty walking or climbing stairs? Y   Do you have serious difficulty dressing or bathing? Y   Because of a physical, mental, or emotional condition, do you have serious difficulty doing errands alone such as visiting the doctor? Y

## 2024-05-02 NOTE — PROGRESS NOTES
[facility]  [phone]     05/02/24 1324   Current Planned Discharge Disposition   Current Planned Discharge Disposition Inter

## 2024-05-03 ENCOUNTER — APPOINTMENT (OUTPATIENT)
Dept: NEUROLOGY | Facility: HOSPITAL | Age: 79
DRG: 689 | End: 2024-05-03
Payer: MEDICARE

## 2024-05-03 LAB
ANION GAP SERPL CALC-SCNC: 13 MMOL/L (ref 10–20)
BUN SERPL-MCNC: 16 MG/DL (ref 6–23)
CALCIUM SERPL-MCNC: 8.3 MG/DL (ref 8.6–10.3)
CHLORIDE SERPL-SCNC: 102 MMOL/L (ref 98–107)
CO2 SERPL-SCNC: 26 MMOL/L (ref 21–32)
CREAT SERPL-MCNC: 0.77 MG/DL (ref 0.5–1.05)
EGFRCR SERPLBLD CKD-EPI 2021: 80 ML/MIN/1.73M*2
ERYTHROCYTE [DISTWIDTH] IN BLOOD BY AUTOMATED COUNT: 14.2 % (ref 11.5–14.5)
GLUCOSE SERPL-MCNC: 75 MG/DL (ref 74–99)
HCT VFR BLD AUTO: 39 % (ref 36–46)
HGB BLD-MCNC: 12.3 G/DL (ref 12–16)
MCH RBC QN AUTO: 27.1 PG (ref 26–34)
MCHC RBC AUTO-ENTMCNC: 31.5 G/DL (ref 32–36)
MCV RBC AUTO: 86 FL (ref 80–100)
NRBC BLD-RTO: 0 /100 WBCS (ref 0–0)
PLATELET # BLD AUTO: 200 X10*3/UL (ref 150–450)
POTASSIUM SERPL-SCNC: 3.9 MMOL/L (ref 3.5–5.3)
RBC # BLD AUTO: 4.54 X10*6/UL (ref 4–5.2)
SODIUM SERPL-SCNC: 137 MMOL/L (ref 136–145)
WBC # BLD AUTO: 4.1 X10*3/UL (ref 4.4–11.3)

## 2024-05-03 PROCEDURE — 95819 EEG AWAKE AND ASLEEP: CPT | Performed by: PSYCHIATRY & NEUROLOGY

## 2024-05-03 PROCEDURE — 1200000002 HC GENERAL ROOM WITH TELEMETRY DAILY

## 2024-05-03 PROCEDURE — 2500000004 HC RX 250 GENERAL PHARMACY W/ HCPCS (ALT 636 FOR OP/ED): Performed by: FAMILY MEDICINE

## 2024-05-03 PROCEDURE — 95819 EEG AWAKE AND ASLEEP: CPT

## 2024-05-03 PROCEDURE — 85027 COMPLETE CBC AUTOMATED: CPT | Performed by: FAMILY MEDICINE

## 2024-05-03 PROCEDURE — 80048 BASIC METABOLIC PNL TOTAL CA: CPT | Performed by: FAMILY MEDICINE

## 2024-05-03 PROCEDURE — 99221 1ST HOSP IP/OBS SF/LOW 40: CPT | Performed by: STUDENT IN AN ORGANIZED HEALTH CARE EDUCATION/TRAINING PROGRAM

## 2024-05-03 PROCEDURE — 36415 COLL VENOUS BLD VENIPUNCTURE: CPT | Performed by: FAMILY MEDICINE

## 2024-05-03 RX ORDER — GUAIFENESIN 100 MG/5ML
200 SOLUTION ORAL EVERY 4 HOURS PRN
COMMUNITY

## 2024-05-03 RX ORDER — EMOLLIENT COMBINATION NO.110
1 LOTION (ML) TOPICAL DAILY
COMMUNITY

## 2024-05-03 RX ORDER — CETIRIZINE HYDROCHLORIDE 10 MG/1
10 TABLET ORAL DAILY PRN
COMMUNITY

## 2024-05-03 RX ORDER — CEFTRIAXONE 1 G/50ML
1 INJECTION, SOLUTION INTRAVENOUS ONCE
Status: COMPLETED | OUTPATIENT
Start: 2024-05-03 | End: 2024-05-03

## 2024-05-03 RX ADMIN — POLYETHYLENE GLYCOL 3350 17 G: 17 POWDER, FOR SOLUTION ORAL at 08:16

## 2024-05-03 RX ADMIN — CEFTRIAXONE SODIUM 1 G: 1 INJECTION, SOLUTION INTRAVENOUS at 09:57

## 2024-05-03 RX ADMIN — ENOXAPARIN SODIUM 40 MG: 40 INJECTION SUBCUTANEOUS at 23:21

## 2024-05-03 ASSESSMENT — COGNITIVE AND FUNCTIONAL STATUS - GENERAL
CLIMB 3 TO 5 STEPS WITH RAILING: A LITTLE
TOILETING: A LITTLE
MOVING TO AND FROM BED TO CHAIR: A LITTLE
MOVING FROM LYING ON BACK TO SITTING ON SIDE OF FLAT BED WITH BEDRAILS: A LITTLE
PERSONAL GROOMING: A LITTLE
MOBILITY SCORE: 17
MOBILITY SCORE: 18
DAILY ACTIVITIY SCORE: 18
CLIMB 3 TO 5 STEPS WITH RAILING: TOTAL
STANDING UP FROM CHAIR USING ARMS: A LITTLE
TOILETING: A LITTLE
TURNING FROM BACK TO SIDE WHILE IN FLAT BAD: A LITTLE
WALKING IN HOSPITAL ROOM: A LITTLE
EATING MEALS: A LITTLE
STANDING UP FROM CHAIR USING ARMS: A LITTLE
DRESSING REGULAR UPPER BODY CLOTHING: A LITTLE
WALKING IN HOSPITAL ROOM: A LOT
DRESSING REGULAR LOWER BODY CLOTHING: A LITTLE
HELP NEEDED FOR BATHING: A LITTLE
HELP NEEDED FOR BATHING: A LITTLE
DRESSING REGULAR LOWER BODY CLOTHING: A LOT
DRESSING REGULAR UPPER BODY CLOTHING: A LITTLE
MOVING TO AND FROM BED TO CHAIR: A LITTLE
DAILY ACTIVITIY SCORE: 19

## 2024-05-03 ASSESSMENT — VISUAL ACUITY: VA_NORMAL: 1

## 2024-05-03 ASSESSMENT — PAIN SCALES - PAIN ASSESSMENT IN ADVANCED DEMENTIA (PAINAD)
TOTALSCORE: 0
BODYLANGUAGE: RELAXED
BREATHING: NORMAL
FACIALEXPRESSION: SMILING OR INEXPRESSIVE
CONSOLABILITY: NO NEED TO CONSOLE

## 2024-05-03 ASSESSMENT — PAIN - FUNCTIONAL ASSESSMENT
PAIN_FUNCTIONAL_ASSESSMENT: 0-10
PAIN_FUNCTIONAL_ASSESSMENT: PAINAD (PAIN ASSESSMENT IN ADVANCED DEMENTIA SCALE)

## 2024-05-03 ASSESSMENT — PAIN SCALES - GENERAL
PAINLEVEL_OUTOF10: 0 - NO PAIN
PAINLEVEL_OUTOF10: 0 - NO PAIN

## 2024-05-03 NOTE — PROGRESS NOTES
05/03/24 1602   Discharge Planning   [facility]: [facility]. Memory care. Facility has outpatient PT OT. Facility does not provide transportation at discharge.

## 2024-05-03 NOTE — PROGRESS NOTES
Pharmacy Medication History Review    Elizabeth Garcia is a 78 y.o. female admitted for Urinary tract infection without hematuria, site unspecified. Pharmacy reviewed the patient's [id number] medications and allergies for accuracy.    The list below reflectives the updated PTA list. Please review each medication in order reconciliation for additional clarification and justification.       The list below reflectives the updated allergy list. Please review each documented allergy for additional clarification and justification.  Allergies  Reviewed by Gianni Jeffers MD on 5/2/2024   No Known Allergies         Below are additional concerns with the patient's PTA list.  Prior to Admission Medications   Prescriptions Last Dose Informant   cetirizine (ZyrTEC) 10 mg tablet     Sig: Take 1 tablet (10 mg) by mouth once daily as needed for allergies.   emollient combination no.110 (Eucerin Intensive Repair) lotion     Sig: Apply 1 Application topically once daily. To face   guaiFENesin (Robitussin) 100 mg/5 mL syrup     Sig: Take 10 mL (200 mg) by mouth every 4 hours if needed for cough.   methyl salicylate/menthol (ICY HOT TOP)     Sig: Apply 1 Application topically 3 times a day as needed (for lower back pain).      Facility-Administered Medications: None      Per med list from facility    Juliane Mathis

## 2024-05-03 NOTE — CONSULTS
"Subjective   History Of Present Illness  Elizabeth Garcia is a 78 y.o. female admitted on 5/2/2024 with Pmx of dementia (ANO x 1 at baseline) presents from her facility for seizure-like activity lasting 10 to 15 seconds.  She had a similar occurrence in February in the setting of a UTI.    In February, patient documentation states she was agitated with difficulty verbalizing and communicating.    No seizure activity observed on arrival. Patient appears at baseline but unable to give hx.     Lactate 3.6 on arrival. UTI noted  Review of systems as noted above, otherwise negative.    Past Medical History  Past Medical History:   Diagnosis Date    Dementia (Multi)     Personal history of other specified conditions 03/18/2015    History of chest pain     Surgical History  No past surgical history on file.  Social History  Social History     Tobacco Use    Smoking status: Unknown     Allergies  Reviewed in EMR       Objective   Last Recorded Vitals  Blood pressure 120/60, pulse 67, temperature 36.4 °C (97.6 °F), temperature source Temporal, resp. rate 19, height 1.702 m (5' 7\"), weight 64.1 kg (141 lb 5 oz), SpO2 100%.    Scheduled medications  cefTRIAXone, 1 g, intravenous, Once  enoxaparin, 40 mg, subcutaneous, q24h  polyethylene glycol, 17 g, oral, Daily      Continuous medications     PRN medications  PRN medications: acetaminophen **OR** acetaminophen **OR** acetaminophen, acetaminophen **OR** acetaminophen **OR** acetaminophen, guaiFENesin, melatonin, ondansetron **OR** ondansetron         Exam   Neurological Exam  Mental Status  Awake and alert. Oriented only to person and place. Recalls 0 of 3 objects immediately. Speech is normal. Able to name objects and repeat. Follows two-step commands. Attention and concentration: Attends to examiner. Fund of knowledge is abnormal.  Limited historian, repeating some phrases. Responses appear automatic. .    Cranial Nerves  CN II: Visual acuity is normal. Visual fields full to " confrontation.  CN III, IV, VI: Extraocular movements intact bilaterally. Pupils equal round and reactive to light bilaterally.  CN V: Facial sensation is normal.  CN VII: Full and symmetric facial movement.  CN VIII: Hearing is normal.  CN XII: Tongue midline without atrophy or fasciculations.    Motor  Decreased muscle bulk throughout. No fasciculations present. Normal muscle tone. No abnormal involuntary movements. Strength is 5/5 throughout all four extremities.  All 4 extremities briskly antigravity with resistance.    Sensory  Responds to tactile stimuli in all 4 extremities.    Reflexes                                            Right                      Left  Brachioradialis                    2+                         2+  Biceps                                 2+                         2+  Triceps                                2+                         2+  Patellar                                2+                         2+    Right pathological reflexes: Ankle clonus absent.  Left pathological reflexes: Ankle clonus absent.    Coordination    Limited cooperation .    Physical Exam  Constitutional:       General: She is awake.   Eyes:      Extraocular Movements: Extraocular movements intact.      Pupils: Pupils are equal, round, and reactive to light.   Neurological:      Mental Status: She is alert.      Motor: Motor strength is normal.     Deep Tendon Reflexes:      Reflex Scores:       Tricep reflexes are 2+ on the right side and 2+ on the left side.       Bicep reflexes are 2+ on the right side and 2+ on the left side.       Brachioradialis reflexes are 2+ on the right side and 2+ on the left side.       Patellar reflexes are 2+ on the right side and 2+ on the left side.  Psychiatric:         Speech: Speech normal.         Relevant Results    Lab Results  Results from last 72 hours   Lab Units 05/02/24  1032   GLUCOSE mg/dL 93   SODIUM mmol/L 139   POTASSIUM mmol/L 3.9   CHLORIDE mmol/L 104   CO2  mmol/L 24   ANION GAP mmol/L 15   BUN mg/dL 26*   CREATININE mg/dL 0.85   EGFR mL/min/1.73m*2 71   CALCIUM mg/dL 8.8   ALBUMIN g/dL 4.0   MAGNESIUM mg/dL 2.10      Results from last 72 hours   Lab Units 05/02/24  1032   WBC AUTO x10*3/uL 4.3*   NRBC AUTO /100 WBCs 0.0   RBC AUTO x10*6/uL 4.53   HEMOGLOBIN g/dL 12.4   HEMATOCRIT % 38.9   MCV fL 86   MCH pg 27.4   MCHC g/dL 31.9*   RDW % 14.5   PLATELETS AUTO x10*3/uL 191   NEUTROS PCT AUTO % 52.9   IG PCT AUTO % 0.2   LYMPHS PCT AUTO % 38.3   MONOS PCT AUTO % 6.5   EOS PCT AUTO % 1.9   BASOS PCT AUTO % 0.2   NEUTROS ABS x10*3/uL 2.26   IG AUTO x10*3/uL 0.01   LYMPHS ABS AUTO x10*3/uL 1.64   MONOS ABS AUTO x10*3/uL 0.28   EOS ABS AUTO x10*3/uL 0.08   BASOS ABS AUTO x10*3/uL 0.01      Results from last 72 hours   Lab Units 05/02/24  1032   PROTIME seconds 12.0   INR  1.1   APTT seconds 32        CT head wo IV contrast   Final Result   No acute intracranial pathology.        MACRO:   None        Signed by: Savanna Diaz 5/2/2024 1:46 PM   Dictation workstation:   [id number]      XR chest 1 view   Final Result   1.  No acute findings on single AP view of the chest.   Signed by Frank Alex MD            Assessment/Plan     #Seizure like activtiy  #Metabolic encephalopathy  Semiology documented not clearly epileptic, may represent rigors/tremulousness in setting of infection.  On arrival, lactate was elevated 3.6 from 0.9 (not necessarily specific for seizure in setting of infection).  TSH is elevated at 7.5 however free thyroxine is normal. EEG is indicative of a moderate to severe diffuse encephalopathy. No epileptiform discharges are seen. Given baseline neurodegenerative condition, she is suspected be highly susceptible to metabolic derangement causing confusion.      rEEG reviewed  Would not start anti seizure medication at this time  MRI not likely to  given that patient is at baseline  Recommend Delirium/Dementia Precautions:       - Minimize  use of benzos, opiates, anticholinergics, as these may worsen mental status       - Would use caution with narcotic pain medications       - Would still adequately controlling pain, as uncontrolled pain is also a risk factor for delirium       - Reinforce sleep hygiene; encourage patient to stay awake during the day       - Keep curtains/blinds open during the day and closed at night.       - Would recommend reorienting/redirecting patient as much as possible,        - Aim for consistent staffing, familiar objects, avoiding bright lights and loud noises, etc.       - Can consider melatonin at night before bed.    Please call with concerns or questions         I spent 35 minutes in the professional and overall care of this patient.      Shantanu Daly MD

## 2024-05-04 VITALS
HEART RATE: 98 BPM | DIASTOLIC BLOOD PRESSURE: 74 MMHG | TEMPERATURE: 99.3 F | BODY MASS INDEX: 22.18 KG/M2 | OXYGEN SATURATION: 98 % | SYSTOLIC BLOOD PRESSURE: 100 MMHG | WEIGHT: 141.31 LBS | HEIGHT: 67 IN | RESPIRATION RATE: 19 BRPM

## 2024-05-04 LAB
ANION GAP SERPL CALC-SCNC: 14 MMOL/L (ref 10–20)
BUN SERPL-MCNC: 19 MG/DL (ref 6–23)
CALCIUM SERPL-MCNC: 8.3 MG/DL (ref 8.6–10.3)
CHLORIDE SERPL-SCNC: 103 MMOL/L (ref 98–107)
CO2 SERPL-SCNC: 21 MMOL/L (ref 21–32)
CREAT SERPL-MCNC: 0.84 MG/DL (ref 0.5–1.05)
EGFRCR SERPLBLD CKD-EPI 2021: 72 ML/MIN/1.73M*2
ERYTHROCYTE [DISTWIDTH] IN BLOOD BY AUTOMATED COUNT: 14.2 % (ref 11.5–14.5)
GLUCOSE SERPL-MCNC: 101 MG/DL (ref 74–99)
HCT VFR BLD AUTO: 40.7 % (ref 36–46)
HGB BLD-MCNC: 13.1 G/DL (ref 12–16)
MCH RBC QN AUTO: 27 PG (ref 26–34)
MCHC RBC AUTO-ENTMCNC: 32.2 G/DL (ref 32–36)
MCV RBC AUTO: 84 FL (ref 80–100)
NRBC BLD-RTO: 0 /100 WBCS (ref 0–0)
PLATELET # BLD AUTO: 212 X10*3/UL (ref 150–450)
POTASSIUM SERPL-SCNC: 5.1 MMOL/L (ref 3.5–5.3)
RBC # BLD AUTO: 4.85 X10*6/UL (ref 4–5.2)
SODIUM SERPL-SCNC: 133 MMOL/L (ref 136–145)
WBC # BLD AUTO: 6.8 X10*3/UL (ref 4.4–11.3)

## 2024-05-04 PROCEDURE — 36415 COLL VENOUS BLD VENIPUNCTURE: CPT | Performed by: FAMILY MEDICINE

## 2024-05-04 PROCEDURE — 80048 BASIC METABOLIC PNL TOTAL CA: CPT | Performed by: FAMILY MEDICINE

## 2024-05-04 PROCEDURE — 2500000004 HC RX 250 GENERAL PHARMACY W/ HCPCS (ALT 636 FOR OP/ED): Performed by: FAMILY MEDICINE

## 2024-05-04 PROCEDURE — 85027 COMPLETE CBC AUTOMATED: CPT | Performed by: FAMILY MEDICINE

## 2024-05-04 RX ORDER — CEPHALEXIN 500 MG/1
500 CAPSULE ORAL 3 TIMES DAILY
Start: 2024-05-04 | End: 2024-05-09

## 2024-05-04 RX ORDER — CEFTRIAXONE 1 G/50ML
1 INJECTION, SOLUTION INTRAVENOUS EVERY 24 HOURS
Status: DISCONTINUED | OUTPATIENT
Start: 2024-05-04 | End: 2024-05-04 | Stop reason: HOSPADM

## 2024-05-04 RX ADMIN — CEFTRIAXONE SODIUM 1 G: 1 INJECTION, SOLUTION INTRAVENOUS at 09:21

## 2024-05-04 NOTE — DISCHARGE SUMMARY
Discharge Diagnosis  Urinary tract infection without hematuria, site unspecified    Issues Requiring Follow-Up  UTI    Discharge Meds     Your medication list        START taking these medications        Instructions Last Dose Given Next Dose Due   cephalexin 500 mg capsule  Commonly known as: Keflex      Take 1 capsule (500 mg) by mouth 3 times a day for 5 days.              CONTINUE taking these medications        Instructions Last Dose Given Next Dose Due   cetirizine 10 mg tablet  Commonly known as: ZyrTEC           Eucerin Intensive Repair lotion  Generic drug: emollient combination no.110           guaiFENesin 100 mg/5 mL syrup  Commonly known as: Robitussin           ICY HOT TOP                     Where to Get Your Medications        Information about where to get these medications is not yet available    Ask your nurse or doctor about these medications  cephalexin 500 mg capsule         Test Results Pending At Discharge  Pending Labs       No current pending labs.            Hospital Course   Pt was admitted to hospital from facility due to change in mental status and suspicion that she had a seizure, she was noted to have UTI with e coli  And seen neuro eeg showing encephaloapthy changes,   Treated for UTI and will be discharged to facility   No antiepileptic meds at this time  Pertinent Physical Exam At Time of Discharge  Physical Exam    Outpatient Follow-Up  No future appointments.      Gianni Jeffers MD

## 2024-05-04 NOTE — H&P
History Of Present Illness  Elizabeth Garcia is a 78 y.o. female presenting with increased somnolence and then seizure like activity,   Pt does have h/o dementia and lives in the facility , history from her son, per son she was more somnolent in the morning.  Pt was brought to ED and was noted to have UTI and has been started on iv antibiotics,   She is feeling better  No pain  No difficulty breathign,   However limited hx due to dementia  She thinks she is living with her parents     Past Medical History  She has a past medical history of Dementia (Multi) and Personal history of other specified conditions (03/18/2015).  UTI  Surgical History  She has no past surgical history on file.     Social History  She has no history on file for tobacco use, alcohol use, and drug use.    Family History  No family history on file.     Allergies  Patient has no known allergies.    Review of Systems   Unable to get due to dementia  Physical Exam     Last Recorded Vitals  /60 (BP Location: Left arm, Patient Position: Lying)   Pulse 67   Temp 36.6 °C (97.9 °F) (Temporal)   Resp 19   Wt 64.1 kg (141 lb 5 oz)   SpO2 100%     Relevant Results  Scheduled medications  enoxaparin, 40 mg, subcutaneous, q24h  polyethylene glycol, 17 g, oral, Daily      Continuous medications     PRN medications  PRN medications: acetaminophen **OR** acetaminophen **OR** acetaminophen, acetaminophen **OR** acetaminophen **OR** acetaminophen, guaiFENesin, melatonin, ondansetron **OR** ondansetron  Results for orders placed or performed during the hospital encounter of 05/02/24 (from the past 96 hour(s))   ECG 12 Lead   Result Value Ref Range    Ventricular Rate 71 BPM    Atrial Rate 71 BPM    KY Interval 202 ms    QRS Duration 80 ms    QT Interval 406 ms    QTC Calculation(Bazett) 441 ms    P Axis 51 degrees    R Axis 30 degrees    T Axis 69 degrees    QRS Count 12 beats    Q Onset 217 ms    P Onset 116 ms    P Offset 170 ms    T Offset 420 ms    [facility]   CBC and Auto Differential   Result Value Ref Range    WBC 4.3 (L) 4.4 - 11.3 x10*3/uL    nRBC 0.0 0.0 - 0.0 /100 WBCs    RBC 4.53 4.00 - 5.20 x10*6/uL    Hemoglobin 12.4 12.0 - 16.0 g/dL    Hematocrit 38.9 36.0 - 46.0 %    MCV 86 80 - 100 fL    MCH 27.4 26.0 - 34.0 pg    MCHC 31.9 (L) 32.0 - 36.0 g/dL    RDW 14.5 11.5 - 14.5 %    Platelets 191 150 - 450 x10*3/uL    Neutrophils % 52.9 40.0 - 80.0 %    Immature Granulocytes %, Automated 0.2 0.0 - 0.9 %    Lymphocytes % 38.3 13.0 - 44.0 %    Monocytes % 6.5 2.0 - 10.0 %    Eosinophils % 1.9 0.0 - 6.0 %    Basophils % 0.2 0.0 - 2.0 %    Neutrophils Absolute 2.26 1.60 - 5.50 x10*3/uL    Immature Granulocytes Absolute, Automated 0.01 0.00 - 0.50 x10*3/uL    Lymphocytes Absolute 1.64 0.80 - 3.00 x10*3/uL    Monocytes Absolute 0.28 0.05 - 0.80 x10*3/uL    Eosinophils Absolute 0.08 0.00 - 0.40 x10*3/uL    Basophils Absolute 0.01 0.00 - 0.10 x10*3/uL   Comprehensive metabolic panel   Result Value Ref Range    Glucose 93 74 - 99 mg/dL    Sodium 139 136 - 145 mmol/L    Potassium 3.9 3.5 - 5.3 mmol/L    Chloride 104 98 - 107 mmol/L    Bicarbonate 24 21 - 32 mmol/L    Anion Gap 15 10 - 20 mmol/L    Urea Nitrogen 26 (H) 6 - 23 mg/dL    Creatinine 0.85 0.50 - 1.05 mg/dL    eGFR 71 >60 mL/min/1.73m*2    Calcium 8.8 8.6 - 10.3 mg/dL    Albumin 4.0 3.4 - 5.0 g/dL    Alkaline Phosphatase 83 33 - 136 U/L    Total Protein 7.1 6.4 - 8.2 g/dL    AST 15 9 - 39 U/L    Bilirubin, Total 0.4 0.0 - 1.2 mg/dL    ALT 7 7 - 45 U/L   Troponin I, High Sensitivity   Result Value Ref Range    Troponin I, High Sensitivity 4 0 - 13 ng/L   Magnesium   Result Value Ref Range    Magnesium 2.10 1.60 - 2.40 mg/dL   Lactate   Result Value Ref Range    Lactate 3.6 (H) 0.4 - 2.0 mmol/L   Coagulation Screen   Result Value Ref Range    Protime 12.0 9.8 - 12.8 seconds    INR 1.1 0.9 - 1.1    aPTT 32 27 - 38 seconds   TSH with reflex to Free T4 if abnormal   Result Value Ref Range    Thyroid  Stimulating Hormone 7.52 (H) 0.44 - 3.98 mIU/L   Thyroxine, Free   Result Value Ref Range    Thyroxine, Free 0.84 0.61 - 1.12 ng/dL   Lactate   Result Value Ref Range    Lactate 0.9 0.4 - 2.0 mmol/L   Urinalysis with Reflex Microscopic   Result Value Ref Range    Color, Urine Light-Yellow Light-Yellow, Yellow, Dark-Yellow    Appearance, Urine Turbid (N) Clear    Specific Gravity, Urine 1.017 1.005 - 1.035    pH, Urine 6.5 5.0, 5.5, 6.0, 6.5, 7.0, 7.5, 8.0    Protein, Urine NEGATIVE NEGATIVE, 10 (TRACE), 20 (TRACE) mg/dL    Glucose, Urine Normal Normal mg/dL    Blood, Urine 0.03 (TRACE) (A) NEGATIVE    Ketones, Urine 20 (1+) (A) NEGATIVE mg/dL    Bilirubin, Urine NEGATIVE NEGATIVE    Urobilinogen, Urine Normal Normal mg/dL    Nitrite, Urine 2+ (A) NEGATIVE    Leukocyte Esterase, Urine 500 Alessandra/µL (A) NEGATIVE   Microscopic Only, Urine   Result Value Ref Range    WBC, Urine >50 (A) 1-5, NONE /HPF    RBC, Urine 3-5 NONE, 1-2, 3-5 /HPF    Bacteria, Urine 4+ (A) NONE SEEN /HPF    Mucus, Urine 1+ Reference range not established. /LPF   Basic metabolic panel   Result Value Ref Range    Glucose 75 74 - 99 mg/dL    Sodium 137 136 - 145 mmol/L    Potassium 3.9 3.5 - 5.3 mmol/L    Chloride 102 98 - 107 mmol/L    Bicarbonate 26 21 - 32 mmol/L    Anion Gap 13 10 - 20 mmol/L    Urea Nitrogen 16 6 - 23 mg/dL    Creatinine 0.77 0.50 - 1.05 mg/dL    eGFR 80 >60 mL/min/1.73m*2    Calcium 8.3 (L) 8.6 - 10.3 mg/dL   CBC   Result Value Ref Range    WBC 4.1 (L) 4.4 - 11.3 x10*3/uL    nRBC 0.0 0.0 - 0.0 /100 WBCs    RBC 4.54 4.00 - 5.20 x10*6/uL    Hemoglobin 12.3 12.0 - 16.0 g/dL    Hematocrit 39.0 36.0 - 46.0 %    MCV 86 80 - 100 fL    MCH 27.1 26.0 - 34.0 pg    MCHC 31.5 (L) 32.0 - 36.0 g/dL    RDW 14.2 11.5 - 14.5 %    Platelets 200 150 - 450 x10*3/uL     CT head no acute issues         Assessment/Plan   Principal Problem:    Urinary tract infection without hematuria, site unspecified  Demenita  Seizure like activity   Dvt ppx    Dw  son, no prior hx of seizures  Consult neuro   Contwtih iv antibiotics while cultures pending   Seizure precautions  Dvt ppx           Gianni Jeffers MD

## 2024-05-04 NOTE — PROGRESS NOTES
Elizabeth Garcia is a 78 y.o. female on day 2 of admission presenting with Urinary tract infection without hematuria, site unspecified.      Subjective   Pt is feeling ok   No pain   No difficulty breathgn   No events overnight       Objective     Last Recorded Vitals  /74 (BP Location: Right arm, Patient Position: Lying)   Pulse 77   Temp 37.4 °C (99.4 °F) (Temporal)   Resp 19   Wt 64.1 kg (141 lb 5 oz)   SpO2 99%   Intake/Output last 3 Shifts:    Intake/Output Summary (Last 24 hours) at 5/4/2024 1242  Last data filed at 5/4/2024 0610  Gross per 24 hour   Intake --   Output 450 ml   Net -450 ml       Admission Weight  Weight: 65.8 kg (145 lb) (05/02/24 1005)    Daily Weight  05/03/24 : 64.1 kg (141 lb 5 oz)    Image Results  EEG  IMPRESSION    Impression  This routine EEG is indicative of a moderate to severe diffuse encephalopathy. No epileptiform discharges are seen.    A full report will be scanned into the patient's chart at a later time.    This report has been interpreted and electronically signed by      Physical Exam  No distress  Chest clear  [employer]S regualr  Ext no edema  Abdo soft bs active, no masses  Cns alert answers simple questions  Able to mvoe ext    Relevant Results  Scheduled medications  cefTRIAXone, 1 g, intravenous, q24h  enoxaparin, 40 mg, subcutaneous, q24h  polyethylene glycol, 17 g, oral, Daily      Continuous medications     PRN medications  PRN medications: acetaminophen **OR** acetaminophen **OR** acetaminophen, acetaminophen **OR** acetaminophen **OR** acetaminophen, guaiFENesin, melatonin, ondansetron **OR** ondansetron  Results for orders placed or performed during the hospital encounter of 05/02/24 (from the past 96 hour(s))   ECG 12 Lead   Result Value Ref Range    Ventricular Rate 71 BPM    Atrial Rate 71 BPM    GA Interval 202 ms    QRS Duration 80 ms    QT Interval 406 ms    QTC Calculation(Bazett) 441 ms    P Axis 51 degrees    R Axis 30 degrees    T Axis 69 degrees    QRS  Count 12 beats    Q Onset 217 ms    P Onset 116 ms    P Offset 170 ms    T Offset 420 ms    QTC Fredericia 429 ms   CBC and Auto Differential   Result Value Ref Range    WBC 4.3 (L) 4.4 - 11.3 x10*3/uL    nRBC 0.0 0.0 - 0.0 /100 WBCs    RBC 4.53 4.00 - 5.20 x10*6/uL    Hemoglobin 12.4 12.0 - 16.0 g/dL    Hematocrit 38.9 36.0 - 46.0 %    MCV 86 80 - 100 fL    MCH 27.4 26.0 - 34.0 pg    MCHC 31.9 (L) 32.0 - 36.0 g/dL    RDW 14.5 11.5 - 14.5 %    Platelets 191 150 - 450 x10*3/uL    Neutrophils % 52.9 40.0 - 80.0 %    Immature Granulocytes %, Automated 0.2 0.0 - 0.9 %    Lymphocytes % 38.3 13.0 - 44.0 %    Monocytes % 6.5 2.0 - 10.0 %    Eosinophils % 1.9 0.0 - 6.0 %    Basophils % 0.2 0.0 - 2.0 %    Neutrophils Absolute 2.26 1.60 - 5.50 x10*3/uL    Immature Granulocytes Absolute, Automated 0.01 0.00 - 0.50 x10*3/uL    Lymphocytes Absolute 1.64 0.80 - 3.00 x10*3/uL    Monocytes Absolute 0.28 0.05 - 0.80 x10*3/uL    Eosinophils Absolute 0.08 0.00 - 0.40 x10*3/uL    Basophils Absolute 0.01 0.00 - 0.10 x10*3/uL   Comprehensive metabolic panel   Result Value Ref Range    Glucose 93 74 - 99 mg/dL    Sodium 139 136 - 145 mmol/L    Potassium 3.9 3.5 - 5.3 mmol/L    Chloride 104 98 - 107 mmol/L    Bicarbonate 24 21 - 32 mmol/L    Anion Gap 15 10 - 20 mmol/L    Urea Nitrogen 26 (H) 6 - 23 mg/dL    Creatinine 0.85 0.50 - 1.05 mg/dL    eGFR 71 >60 mL/min/1.73m*2    Calcium 8.8 8.6 - 10.3 mg/dL    Albumin 4.0 3.4 - 5.0 g/dL    Alkaline Phosphatase 83 33 - 136 U/L    Total Protein 7.1 6.4 - 8.2 g/dL    AST 15 9 - 39 U/L    Bilirubin, Total 0.4 0.0 - 1.2 mg/dL    ALT 7 7 - 45 U/L   Troponin I, High Sensitivity   Result Value Ref Range    Troponin I, High Sensitivity 4 0 - 13 ng/L   Magnesium   Result Value Ref Range    Magnesium 2.10 1.60 - 2.40 mg/dL   Lactate   Result Value Ref Range    Lactate 3.6 (H) 0.4 - 2.0 mmol/L   Coagulation Screen   Result Value Ref Range    Protime 12.0 9.8 - 12.8 seconds    INR 1.1 0.9 - 1.1    aPTT 32  27 - 38 seconds   TSH with reflex to Free T4 if abnormal   Result Value Ref Range    Thyroid Stimulating Hormone 7.52 (H) 0.44 - 3.98 mIU/L   Thyroxine, Free   Result Value Ref Range    Thyroxine, Free 0.84 0.61 - 1.12 ng/dL   Lactate   Result Value Ref Range    Lactate 0.9 0.4 - 2.0 mmol/L   Urinalysis with Reflex Microscopic   Result Value Ref Range    Color, Urine Light-Yellow Light-Yellow, Yellow, Dark-Yellow    Appearance, Urine Turbid (N) Clear    Specific Gravity, Urine 1.017 1.005 - 1.035    pH, Urine 6.5 5.0, 5.5, 6.0, 6.5, 7.0, 7.5, 8.0    Protein, Urine NEGATIVE NEGATIVE, 10 (TRACE), 20 (TRACE) mg/dL    Glucose, Urine Normal Normal mg/dL    Blood, Urine 0.03 (TRACE) (A) NEGATIVE    Ketones, Urine 20 (1+) (A) NEGATIVE mg/dL    Bilirubin, Urine NEGATIVE NEGATIVE    Urobilinogen, Urine Normal Normal mg/dL    Nitrite, Urine 2+ (A) NEGATIVE    Leukocyte Esterase, Urine 500 Alessandra/µL (A) NEGATIVE   Microscopic Only, Urine   Result Value Ref Range    WBC, Urine >50 (A) 1-5, NONE /HPF    RBC, Urine 3-5 NONE, 1-2, 3-5 /HPF    Bacteria, Urine 4+ (A) NONE SEEN /HPF    Mucus, Urine 1+ Reference range not established. /LPF   Basic metabolic panel   Result Value Ref Range    Glucose 75 74 - 99 mg/dL    Sodium 137 136 - 145 mmol/L    Potassium 3.9 3.5 - 5.3 mmol/L    Chloride 102 98 - 107 mmol/L    Bicarbonate 26 21 - 32 mmol/L    Anion Gap 13 10 - 20 mmol/L    Urea Nitrogen 16 6 - 23 mg/dL    Creatinine 0.77 0.50 - 1.05 mg/dL    eGFR 80 >60 mL/min/1.73m*2    Calcium 8.3 (L) 8.6 - 10.3 mg/dL   CBC   Result Value Ref Range    WBC 4.1 (L) 4.4 - 11.3 x10*3/uL    nRBC 0.0 0.0 - 0.0 /100 WBCs    RBC 4.54 4.00 - 5.20 x10*6/uL    Hemoglobin 12.3 12.0 - 16.0 g/dL    Hematocrit 39.0 36.0 - 46.0 %    MCV 86 80 - 100 fL    MCH 27.1 26.0 - 34.0 pg    MCHC 31.5 (L) 32.0 - 36.0 g/dL    RDW 14.2 11.5 - 14.5 %    Platelets 200 150 - 450 x10*3/uL   Basic metabolic panel   Result Value Ref Range    Glucose 101 (H) 74 - 99 mg/dL    Sodium  133 (L) 136 - 145 mmol/L    Potassium 5.1 3.5 - 5.3 mmol/L    Chloride 103 98 - 107 mmol/L    Bicarbonate 21 21 - 32 mmol/L    Anion Gap 14 10 - 20 mmol/L    Urea Nitrogen 19 6 - 23 mg/dL    Creatinine 0.84 0.50 - 1.05 mg/dL    eGFR 72 >60 mL/min/1.73m*2    Calcium 8.3 (L) 8.6 - 10.3 mg/dL   CBC   Result Value Ref Range    WBC 6.8 4.4 - 11.3 x10*3/uL    nRBC 0.0 0.0 - 0.0 /100 WBCs    RBC 4.85 4.00 - 5.20 x10*6/uL    Hemoglobin 13.1 12.0 - 16.0 g/dL    Hematocrit 40.7 36.0 - 46.0 %    MCV 84 80 - 100 fL    MCH 27.0 26.0 - 34.0 pg    MCHC 32.2 32.0 - 36.0 g/dL    RDW 14.2 11.5 - 14.5 %    Platelets 212 150 - 450 x10*3/uL                Assessment/Plan        Principal Problem:    Urinary tract infection without hematuria, site unspecified  Encephalopathy resolving     Urine culture e coli    Seizure like activity noted input from neuro   Will plan on dc to facility and no AED at this time  MD Gianni Jones MD

## 2024-05-04 NOTE — NURSING NOTE
Went over dc instructions with this pt and her son Tomer Garcia. All questions answered and pt an dson are in agreement. IV removed. VSS. All patient belongings returned. Son Tomer said he was going to bring her home to get cleaned up and will then bring her back to [facility]. Called and left voicemail with [facility] to update with plan and got a voicemail. Left our units phone number in case they had any questions.

## 2024-07-22 ENCOUNTER — APPOINTMENT (OUTPATIENT)
Dept: CARDIOLOGY | Facility: HOSPITAL | Age: 79
End: 2024-07-22
Payer: MEDICARE

## 2024-07-22 ENCOUNTER — APPOINTMENT (OUTPATIENT)
Dept: RADIOLOGY | Facility: HOSPITAL | Age: 79
End: 2024-07-22
Payer: MEDICARE

## 2024-07-22 ENCOUNTER — HOSPITAL ENCOUNTER (INPATIENT)
Facility: HOSPITAL | Age: 79
LOS: 3 days | Discharge: HOME | End: 2024-07-25
Attending: EMERGENCY MEDICINE | Admitting: INTERNAL MEDICINE
Payer: MEDICARE

## 2024-07-22 DIAGNOSIS — N39.0 URINARY TRACT INFECTION WITHOUT HEMATURIA, SITE UNSPECIFIED: ICD-10-CM

## 2024-07-22 DIAGNOSIS — F03.90 DEMENTIA, UNSPECIFIED DEMENTIA SEVERITY, UNSPECIFIED DEMENTIA TYPE, UNSPECIFIED WHETHER BEHAVIORAL, PSYCHOTIC, OR MOOD DISTURBANCE OR ANXIETY (MULTI): Primary | ICD-10-CM

## 2024-07-22 LAB
ALBUMIN SERPL BCP-MCNC: 3.7 G/DL (ref 3.4–5)
ALP SERPL-CCNC: 72 U/L (ref 33–136)
ALT SERPL W P-5'-P-CCNC: 6 U/L (ref 7–45)
AMMONIA PLAS-SCNC: 21 UMOL/L (ref 16–53)
AMORPH CRY #/AREA UR COMP ASSIST: ABNORMAL /HPF
ANION GAP BLDV CALCULATED.4IONS-SCNC: 10 MMOL/L (ref 10–25)
ANION GAP SERPL CALC-SCNC: 13 MMOL/L (ref 10–20)
APPEARANCE UR: ABNORMAL
AST SERPL W P-5'-P-CCNC: 14 U/L (ref 9–39)
BACTERIA #/AREA URNS AUTO: ABNORMAL /HPF
BASE EXCESS BLDV CALC-SCNC: 1 MMOL/L (ref -2–3)
BASOPHILS # BLD AUTO: 0.02 X10*3/UL (ref 0–0.1)
BASOPHILS NFR BLD AUTO: 0.4 %
BILIRUB SERPL-MCNC: 0.3 MG/DL (ref 0–1.2)
BILIRUB UR STRIP.AUTO-MCNC: NEGATIVE MG/DL
BODY TEMPERATURE: 37 DEGREES CELSIUS
BUN SERPL-MCNC: 21 MG/DL (ref 6–23)
CA-I BLDV-SCNC: 1.21 MMOL/L (ref 1.1–1.33)
CALCIUM SERPL-MCNC: 8.5 MG/DL (ref 8.6–10.3)
CHLORIDE BLDV-SCNC: 107 MMOL/L (ref 98–107)
CHLORIDE SERPL-SCNC: 108 MMOL/L (ref 98–107)
CO2 SERPL-SCNC: 23 MMOL/L (ref 21–32)
COLOR UR: ABNORMAL
CREAT SERPL-MCNC: 0.76 MG/DL (ref 0.5–1.05)
EGFRCR SERPLBLD CKD-EPI 2021: 81 ML/MIN/1.73M*2
EOSINOPHIL # BLD AUTO: 0.11 X10*3/UL (ref 0–0.4)
EOSINOPHIL NFR BLD AUTO: 2.5 %
ERYTHROCYTE [DISTWIDTH] IN BLOOD BY AUTOMATED COUNT: 16.8 % (ref 11.5–14.5)
GLUCOSE BLDV-MCNC: 103 MG/DL (ref 74–99)
GLUCOSE SERPL-MCNC: 89 MG/DL (ref 74–99)
GLUCOSE UR STRIP.AUTO-MCNC: NORMAL MG/DL
HCO3 BLDV-SCNC: 27.5 MMOL/L (ref 22–26)
HCT VFR BLD AUTO: 36.8 % (ref 36–46)
HCT VFR BLD EST: 37 % (ref 36–46)
HGB BLD-MCNC: 11.4 G/DL (ref 12–16)
HGB BLDV-MCNC: 12.2 G/DL (ref 12–16)
IMM GRANULOCYTES # BLD AUTO: 0.01 X10*3/UL (ref 0–0.5)
IMM GRANULOCYTES NFR BLD AUTO: 0.2 % (ref 0–0.9)
INHALED O2 CONCENTRATION: 21 %
KETONES UR STRIP.AUTO-MCNC: NEGATIVE MG/DL
LACTATE BLDV-SCNC: 1.2 MMOL/L (ref 0.4–2)
LACTATE BLDV-SCNC: 2.7 MMOL/L (ref 0.4–2)
LACTATE SERPL-SCNC: 0.8 MMOL/L (ref 0.4–2)
LACTATE SERPL-SCNC: 2.4 MMOL/L (ref 0.4–2)
LEUKOCYTE ESTERASE UR QL STRIP.AUTO: ABNORMAL
LYMPHOCYTES # BLD AUTO: 1.34 X10*3/UL (ref 0.8–3)
LYMPHOCYTES NFR BLD AUTO: 30 %
MAGNESIUM SERPL-MCNC: 2 MG/DL (ref 1.6–2.4)
MCH RBC QN AUTO: 26.5 PG (ref 26–34)
MCHC RBC AUTO-ENTMCNC: 31 G/DL (ref 32–36)
MCV RBC AUTO: 86 FL (ref 80–100)
MONOCYTES # BLD AUTO: 0.33 X10*3/UL (ref 0.05–0.8)
MONOCYTES NFR BLD AUTO: 7.4 %
MUCOUS THREADS #/AREA URNS AUTO: ABNORMAL /LPF
NEUTROPHILS # BLD AUTO: 2.66 X10*3/UL (ref 1.6–5.5)
NEUTROPHILS NFR BLD AUTO: 59.5 %
NITRITE UR QL STRIP.AUTO: NEGATIVE
NRBC BLD-RTO: 0 /100 WBCS (ref 0–0)
OXYHGB MFR BLDV: 43.5 % (ref 45–75)
PCO2 BLDV: 51 MM HG (ref 41–51)
PH BLDV: 7.34 PH (ref 7.33–7.43)
PH UR STRIP.AUTO: 8 [PH]
PLATELET # BLD AUTO: 245 X10*3/UL (ref 150–450)
PO2 BLDV: 33 MM HG (ref 35–45)
POTASSIUM BLDV-SCNC: 4.5 MMOL/L (ref 3.5–5.3)
POTASSIUM SERPL-SCNC: 4.3 MMOL/L (ref 3.5–5.3)
PROT SERPL-MCNC: 7 G/DL (ref 6.4–8.2)
PROT UR STRIP.AUTO-MCNC: ABNORMAL MG/DL
RBC # BLD AUTO: 4.3 X10*6/UL (ref 4–5.2)
RBC # UR STRIP.AUTO: ABNORMAL /UL
RBC #/AREA URNS AUTO: >20 /HPF
SAO2 % BLDV: 44 % (ref 45–75)
SODIUM BLDV-SCNC: 140 MMOL/L (ref 136–145)
SODIUM SERPL-SCNC: 140 MMOL/L (ref 136–145)
SP GR UR STRIP.AUTO: 1.01
SQUAMOUS #/AREA URNS AUTO: ABNORMAL /HPF
UROBILINOGEN UR STRIP.AUTO-MCNC: NORMAL MG/DL
WBC # BLD AUTO: 4.5 X10*3/UL (ref 4.4–11.3)
WBC #/AREA URNS AUTO: >50 /HPF
WBC CLUMPS #/AREA URNS AUTO: ABNORMAL /HPF

## 2024-07-22 PROCEDURE — 96365 THER/PROPH/DIAG IV INF INIT: CPT

## 2024-07-22 PROCEDURE — 96367 TX/PROPH/DG ADDL SEQ IV INF: CPT

## 2024-07-22 PROCEDURE — 70450 CT HEAD/BRAIN W/O DYE: CPT

## 2024-07-22 PROCEDURE — 85025 COMPLETE CBC W/AUTO DIFF WBC: CPT

## 2024-07-22 PROCEDURE — 36415 COLL VENOUS BLD VENIPUNCTURE: CPT

## 2024-07-22 PROCEDURE — 96361 HYDRATE IV INFUSION ADD-ON: CPT

## 2024-07-22 PROCEDURE — 70450 CT HEAD/BRAIN W/O DYE: CPT | Performed by: RADIOLOGY

## 2024-07-22 PROCEDURE — 71045 X-RAY EXAM CHEST 1 VIEW: CPT | Performed by: RADIOLOGY

## 2024-07-22 PROCEDURE — 1100000001 HC PRIVATE ROOM DAILY

## 2024-07-22 PROCEDURE — 2500000004 HC RX 250 GENERAL PHARMACY W/ HCPCS (ALT 636 FOR OP/ED)

## 2024-07-22 PROCEDURE — 87086 URINE CULTURE/COLONY COUNT: CPT | Mod: AHULAB

## 2024-07-22 PROCEDURE — 83605 ASSAY OF LACTIC ACID: CPT

## 2024-07-22 PROCEDURE — 81001 URINALYSIS AUTO W/SCOPE: CPT

## 2024-07-22 PROCEDURE — 2500000004 HC RX 250 GENERAL PHARMACY W/ HCPCS (ALT 636 FOR OP/ED): Performed by: INTERNAL MEDICINE

## 2024-07-22 PROCEDURE — 93005 ELECTROCARDIOGRAM TRACING: CPT

## 2024-07-22 PROCEDURE — 84132 ASSAY OF SERUM POTASSIUM: CPT

## 2024-07-22 PROCEDURE — 71045 X-RAY EXAM CHEST 1 VIEW: CPT

## 2024-07-22 PROCEDURE — 82140 ASSAY OF AMMONIA: CPT

## 2024-07-22 PROCEDURE — 83735 ASSAY OF MAGNESIUM: CPT

## 2024-07-22 PROCEDURE — 99285 EMERGENCY DEPT VISIT HI MDM: CPT

## 2024-07-22 RX ORDER — SODIUM CHLORIDE 9 MG/ML
100 INJECTION, SOLUTION INTRAVENOUS CONTINUOUS
Status: DISCONTINUED | OUTPATIENT
Start: 2024-07-22 | End: 2024-07-24

## 2024-07-22 RX ORDER — CEFTRIAXONE 1 G/50ML
1 INJECTION, SOLUTION INTRAVENOUS DAILY
Status: DISCONTINUED | OUTPATIENT
Start: 2024-07-22 | End: 2024-07-24 | Stop reason: ALTCHOICE

## 2024-07-22 RX ORDER — VANCOMYCIN HYDROCHLORIDE 1 G/200ML
1000 INJECTION, SOLUTION INTRAVENOUS ONCE
Status: COMPLETED | OUTPATIENT
Start: 2024-07-22 | End: 2024-07-22

## 2024-07-22 RX ORDER — TALC
3 POWDER (GRAM) TOPICAL NIGHTLY PRN
Status: DISCONTINUED | OUTPATIENT
Start: 2024-07-22 | End: 2024-07-25 | Stop reason: HOSPADM

## 2024-07-22 RX ORDER — ONDANSETRON HYDROCHLORIDE 2 MG/ML
4 INJECTION, SOLUTION INTRAVENOUS EVERY 6 HOURS PRN
Status: DISCONTINUED | OUTPATIENT
Start: 2024-07-22 | End: 2024-07-25 | Stop reason: HOSPADM

## 2024-07-22 RX ORDER — ACETAMINOPHEN 325 MG/1
650 TABLET ORAL EVERY 6 HOURS PRN
Status: DISCONTINUED | OUTPATIENT
Start: 2024-07-22 | End: 2024-07-25 | Stop reason: HOSPADM

## 2024-07-22 RX ORDER — VANCOMYCIN HYDROCHLORIDE 1 G/20ML
INJECTION, POWDER, LYOPHILIZED, FOR SOLUTION INTRAVENOUS DAILY PRN
Status: DISCONTINUED | OUTPATIENT
Start: 2024-07-22 | End: 2024-07-22

## 2024-07-22 RX ORDER — ALUMINUM HYDROXIDE, MAGNESIUM HYDROXIDE, AND SIMETHICONE 1200; 120; 1200 MG/30ML; MG/30ML; MG/30ML
20 SUSPENSION ORAL 4 TIMES DAILY PRN
Status: DISCONTINUED | OUTPATIENT
Start: 2024-07-22 | End: 2024-07-25 | Stop reason: HOSPADM

## 2024-07-22 RX ORDER — PANTOPRAZOLE SODIUM 40 MG/1
40 TABLET, DELAYED RELEASE ORAL
Status: DISCONTINUED | OUTPATIENT
Start: 2024-07-23 | End: 2024-07-25 | Stop reason: HOSPADM

## 2024-07-22 ASSESSMENT — ACTIVITIES OF DAILY LIVING (ADL): LACK_OF_TRANSPORTATION: NO

## 2024-07-22 NOTE — ED TRIAGE NOTES
Pt from memory care unit with dementia. Staff noticed that she screamed out and then noticed stiff shaking and unresponsiveness for 30 seconds. Pt disoriented at baseline. No hx of seizures

## 2024-07-22 NOTE — PROGRESS NOTES
[facility]  [phone]     07/22/24 1357   Current Planned Discharge Disposition   Current Planned Discharge Disposition Inter

## 2024-07-22 NOTE — ED PROVIDER NOTES
Emergency Department Provider Note        History of Present Illness     History provided by: Parent and EMS  Limitations to History: Dementia  External Records Reviewed with Brief Summary: Discharge Summary from 2024 which showed UTI, initially present with seizure -like activity with negative seizure work up     HPI:  Elizabeth Garcia is a 79 y.o. female with past medical history of dementia A&O to self, and frequent UTIs presenting as increased tremor/seizure-like activity presented to the emergency department for concerns of altered mental status and seizure.  Per report, patient is unable to provide history. Appears comfortable and in no distress.    Per son at bedside, notes that this has happened before and that this is because they are not taking good care of her at the facility.  Notes that this does not happen when she is at home, however is unable to take care of her while she is at home.       Physical Exam   Triage vitals:  T 37.2 °C (98.9 °F)  HR 77  /65  RR 15  O2 98 % None (Room air)    GEN:  A&Ox1, no acute distress, appears comfortable.   HEENT: Normocephalic, atraumatic. Conjunctiva pink with no redness or exudates. Moist mucous membranes.  CARDIO: Normal rate and regular rhythm. Normal S1, S2  without murmurs, rubs, or gallops.   PULM: Clear to auscultation bilaterally. No rales, rhonchi, or wheezes. No accessory muscle use or stridor.  GI: Soft, non-tender, non-distended. No rebound tenderness or guarding.   SKIN: Warm and dry, no rashes, lesions, petechiae, or purpura.  MSK: ROM intact in all 4 extremities without contractures or pain. No peripheral edema, contusions, or wounds.    NEURO: Confused, at baseline. Somewhat follows commands, also baseline. No focal findings identified. Moving all extremities.    Medical Decision Making & ED Course   Medical Decision Makin y.o. female past medical history of dementia A&O to self, and frequent UTIs presenting as increased  tremor/seizure-like activity presented to the emergency department for concerns of altered mental status and seizure like activity and was found to have a UTI. She has dementia and is alert and oriented to self only at her baseline, unclear if patient is significantly altered from baseline.  Lactate was noted to be elevated at 2.4, given IV fluids, blood cultures drawn, and started on vanco and zosyn. Discussed admission for UTI with family at bedside, who expressed concerns of care at facility, noting that this has happened multiple times since living at her current facility.  Patient remained hemodynamically stable, was admitted to medicine for further management of UTI.    ----      Differential diagnoses considered include but are not limited to: UTI, pyelonephritis, stroke, intracranial malignancy, worsening of dementia     Social Determinants of Health which Significantly Impact Care:  Poor care at facility per son      EKG Independent Interpretation: EKG interpreted by myself. Please see ED Course for full interpretation.    Independent Result Review and Interpretation: Relevant laboratory and radiographic results were reviewed and independently interpreted by myself.  As necessary, they are commented on in the ED Course.    Chronic conditions affecting the patient's care: As documented above in [facility]    The patient was discussed with the following consultants/services: None    Care Considerations: As documented above in [facility]    ED Course:  ED Course as of 07/23/24 0754   Mon Jul 22, 2024   1658 CT head wo IV contrast  Unremarkable for any acute findings. [AD]   1658 XR chest 1 view  Unremarkable [AD]   1658 ECG 12 lead  EKG showing normal sinus rhythm at 76 bpm, normal axis, normal intervals, no ST elevations or depressions, although motion significant artifact throughout. [AD]      ED Course User Index  [AD] Adilia Eric DO         Diagnoses as of 07/23/24 0754   Dementia, unspecified dementia severity,  unspecified dementia type, unspecified whether behavioral, psychotic, or mood disturbance or anxiety (Multi)   Urinary tract infection without hematuria, site unspecified     Disposition   As a result of their workup, the patient will require admission to the hospital.  The patient was informed of her diagnosis.  The patient was given the opportunity to ask questions and I answered them. The patient agreed to be admitted to the hospital.    Procedures   Procedures    Patient seen and discussed with ED attending physician.    Adilia Hernandes DO  Emergency Medicine       Adilia Hernandes DO  Resident  07/23/24 0756

## 2024-07-22 NOTE — PROGRESS NOTES
Pharmacy Medication History Review    Elizabeth Garcia is a 78 y.o. female admitted for No Principal Problem: There is no principal problem currently on the Problem List. Please update the Problem List and refresh.. Pharmacy reviewed the patient's xwdef-im-drdkozryb medications and allergies for accuracy.    The list below reflectives the updated PTA list. Please review each medication in order reconciliation for additional clarification and justification.  Prior to Admission Medications   Prescriptions Last Dose Informant   cetirizine (ZyrTEC) 10 mg tablet Unknown Other   Sig: Take 1 tablet (10 mg) by mouth once daily as needed for allergies.   emollient combination no.110 (Eucerin Intensive Repair) lotion Unknown Other   Sig: Apply 1 Application topically once daily. To face   guaiFENesin (Robitussin) 100 mg/5 mL syrup Unknown Other   Sig: Take 10 mL (200 mg) by mouth every 4 hours if needed for cough.   methyl salicylate/menthol (ICY HOT TOP) Unknown Other   Sig: Apply 1 Application topically 3 times a day as needed (for lower back pain).      Facility-Administered Medications: None         The list below reflectives the updated allergy list. Please review each documented allergy for additional clarification and justification.  Allergies  Reviewed by Elana Motley on 7/22/2024   No Known Allergies         Below are additional concerns with the patient's PTA list.  Med list from [facility] care facility  Elana Motley

## 2024-07-22 NOTE — PROGRESS NOTES
07/22/24 1357   [facility] Disability Status   Are you deaf or do you have serious difficulty hearing? N   Are you blind or do you have serious difficulty seeing, even when wearing glasses? N   Because of a physical, mental, or emotional condition, do you have serious difficulty concentrating, remembering, or making decisions? (5 years old or older) Y   Do you have serious difficulty walking or climbing stairs? Y   Do you have serious difficulty dressing or bathing? Y   Because of a physical, mental, or emotional condition, do you have serious difficulty doing errands alone such as visiting the doctor? Y

## 2024-07-22 NOTE — PROGRESS NOTES
Transitional Care Coordination Progress Note:  Plan per Medical/Surgical team: treatment of seizure like activity with CTSCANs pending  Status: ED  Payor source: Erin Hopkinsregor care coordinator Christal Laura [phone], fax #[phone]    [facility]  [phone]  Potential Barriers: dementia  ADOD: 7/23/2024  L Pam Luo RN, BSN Transitional Care Coordinator ED# [phone]      07/22/24 1359   Discharge Planning   Living Arrangements Alone   Support Systems Children   Assistance Needed seizure precautions, CTSCAN pending   Type of Residence Nursing home/residential care   Do you have animals or pets at home? No   Type of Post Acute Facility Services Long term care   Expected Discharge Disposition Other  ([facility]  [phone])   Does the patient need discharge transport arranged? Yes   RoundTrip coordination needed? Yes   Has discharge transport been arranged? No   Financial Resource Strain   How hard is it for you to pay for the very basics like food, housing, medical care, and heating? Not hard   Housing Stability   In the last 12 months, was there a time when you were not able to pay the mortgage or rent on time? N   In the past 12 months, how many times have you moved where you were living? 1   At any time in the past 12 months, were you homeless or living in a shelter (including now)? N   Transportation Needs   In the past 12 months, has lack of transportation kept you from medical appointments or from getting medications? no   In the past 12 months, has lack of transportation kept you from meetings, work, or from getting things needed for daily living? No

## 2024-07-23 LAB
ANION GAP SERPL CALC-SCNC: 10 MMOL/L (ref 10–20)
BUN SERPL-MCNC: 13 MG/DL (ref 6–23)
CALCIUM SERPL-MCNC: 8.3 MG/DL (ref 8.6–10.3)
CHLORIDE SERPL-SCNC: 108 MMOL/L (ref 98–107)
CO2 SERPL-SCNC: 24 MMOL/L (ref 21–32)
CREAT SERPL-MCNC: 0.71 MG/DL (ref 0.5–1.05)
EGFRCR SERPLBLD CKD-EPI 2021: 87 ML/MIN/1.73M*2
ERYTHROCYTE [DISTWIDTH] IN BLOOD BY AUTOMATED COUNT: 16.6 % (ref 11.5–14.5)
GLUCOSE SERPL-MCNC: 71 MG/DL (ref 74–99)
HCT VFR BLD AUTO: 38.8 % (ref 36–46)
HGB BLD-MCNC: 11.8 G/DL (ref 12–16)
MCH RBC QN AUTO: 26.8 PG (ref 26–34)
MCHC RBC AUTO-ENTMCNC: 30.4 G/DL (ref 32–36)
MCV RBC AUTO: 88 FL (ref 80–100)
NRBC BLD-RTO: 0 /100 WBCS (ref 0–0)
PLATELET # BLD AUTO: 219 X10*3/UL (ref 150–450)
POTASSIUM SERPL-SCNC: 4.5 MMOL/L (ref 3.5–5.3)
RBC # BLD AUTO: 4.4 X10*6/UL (ref 4–5.2)
SODIUM SERPL-SCNC: 137 MMOL/L (ref 136–145)
WBC # BLD AUTO: 4.4 X10*3/UL (ref 4.4–11.3)

## 2024-07-23 PROCEDURE — 99222 1ST HOSP IP/OBS MODERATE 55: CPT | Performed by: INTERNAL MEDICINE

## 2024-07-23 PROCEDURE — 36415 COLL VENOUS BLD VENIPUNCTURE: CPT | Performed by: NURSE PRACTITIONER

## 2024-07-23 PROCEDURE — 85027 COMPLETE CBC AUTOMATED: CPT | Performed by: NURSE PRACTITIONER

## 2024-07-23 PROCEDURE — 2500000001 HC RX 250 WO HCPCS SELF ADMINISTERED DRUGS (ALT 637 FOR MEDICARE OP): Performed by: INTERNAL MEDICINE

## 2024-07-23 PROCEDURE — 80048 BASIC METABOLIC PNL TOTAL CA: CPT | Performed by: NURSE PRACTITIONER

## 2024-07-23 PROCEDURE — 1100000001 HC PRIVATE ROOM DAILY

## 2024-07-23 PROCEDURE — 2500000004 HC RX 250 GENERAL PHARMACY W/ HCPCS (ALT 636 FOR OP/ED): Performed by: INTERNAL MEDICINE

## 2024-07-23 RX ORDER — HEPARIN SODIUM 5000 [USP'U]/ML
5000 INJECTION, SOLUTION INTRAVENOUS; SUBCUTANEOUS EVERY 8 HOURS
Status: DISCONTINUED | OUTPATIENT
Start: 2024-07-23 | End: 2024-07-24

## 2024-07-23 SDOH — SOCIAL STABILITY: SOCIAL INSECURITY: DO YOU FEEL ANYONE HAS EXPLOITED OR TAKEN ADVANTAGE OF YOU FINANCIALLY OR OF YOUR PERSONAL PROPERTY?: UNABLE TO ASSESS

## 2024-07-23 SDOH — SOCIAL STABILITY: SOCIAL INSECURITY: ABUSE: ADULT

## 2024-07-23 SDOH — SOCIAL STABILITY: SOCIAL INSECURITY: DO YOU FEEL UNSAFE GOING BACK TO THE PLACE WHERE YOU ARE LIVING?: UNABLE TO ASSESS

## 2024-07-23 SDOH — SOCIAL STABILITY: SOCIAL INSECURITY: HAVE YOU HAD ANY THOUGHTS OF HARMING ANYONE ELSE?: UNABLE TO ASSESS

## 2024-07-23 SDOH — SOCIAL STABILITY: SOCIAL INSECURITY: ARE YOU OR HAVE YOU BEEN THREATENED OR ABUSED PHYSICALLY, EMOTIONALLY, OR SEXUALLY BY ANYONE?: UNABLE TO ASSESS

## 2024-07-23 SDOH — SOCIAL STABILITY: SOCIAL INSECURITY: HAS ANYONE EVER THREATENED TO HURT YOUR FAMILY OR YOUR PETS?: UNABLE TO ASSESS

## 2024-07-23 SDOH — SOCIAL STABILITY: SOCIAL INSECURITY: WERE YOU ABLE TO COMPLETE ALL THE BEHAVIORAL HEALTH SCREENINGS?: YES

## 2024-07-23 SDOH — SOCIAL STABILITY: SOCIAL INSECURITY: HAVE YOU HAD THOUGHTS OF HARMING ANYONE ELSE?: UNABLE TO ASSESS

## 2024-07-23 SDOH — SOCIAL STABILITY: SOCIAL INSECURITY: ARE THERE ANY APPARENT SIGNS OF INJURIES/BEHAVIORS THAT COULD BE RELATED TO ABUSE/NEGLECT?: UNABLE TO ASSESS

## 2024-07-23 SDOH — SOCIAL STABILITY: SOCIAL INSECURITY: DOES ANYONE TRY TO KEEP YOU FROM HAVING/CONTACTING OTHER FRIENDS OR DOING THINGS OUTSIDE YOUR HOME?: UNABLE TO ASSESS

## 2024-07-23 ASSESSMENT — COGNITIVE AND FUNCTIONAL STATUS - GENERAL
PERSONAL GROOMING: A LITTLE
WALKING IN HOSPITAL ROOM: A LITTLE
MOVING FROM LYING ON BACK TO SITTING ON SIDE OF FLAT BED WITH BEDRAILS: A LITTLE
STANDING UP FROM CHAIR USING ARMS: A LITTLE
TOILETING: A LITTLE
TURNING FROM BACK TO SIDE WHILE IN FLAT BAD: A LITTLE
HELP NEEDED FOR BATHING: A LITTLE
MOBILITY SCORE: 18
DAILY ACTIVITIY SCORE: 18
DRESSING REGULAR UPPER BODY CLOTHING: A LITTLE
MOVING TO AND FROM BED TO CHAIR: A LITTLE
EATING MEALS: A LITTLE
PATIENT BASELINE BEDBOUND: NO
CLIMB 3 TO 5 STEPS WITH RAILING: A LITTLE
DRESSING REGULAR LOWER BODY CLOTHING: A LITTLE

## 2024-07-23 ASSESSMENT — LIFESTYLE VARIABLES
AUDIT-C TOTAL SCORE: -1
HOW MANY STANDARD DRINKS CONTAINING ALCOHOL DO YOU HAVE ON A TYPICAL DAY: PATIENT DECLINED
HOW OFTEN DO YOU HAVE 6 OR MORE DRINKS ON ONE OCCASION: PATIENT DECLINED
HOW OFTEN DO YOU HAVE A DRINK CONTAINING ALCOHOL: PATIENT DECLINED
AUDIT-C TOTAL SCORE: -1
SKIP TO QUESTIONS 9-10: 0

## 2024-07-23 ASSESSMENT — PATIENT HEALTH QUESTIONNAIRE - PHQ9
2. FEELING DOWN, DEPRESSED OR HOPELESS: NOT AT ALL
SUM OF ALL RESPONSES TO PHQ9 QUESTIONS 1 & 2: 0
1. LITTLE INTEREST OR PLEASURE IN DOING THINGS: NOT AT ALL

## 2024-07-23 ASSESSMENT — ACTIVITIES OF DAILY LIVING (ADL)
HEARING - LEFT EAR: FUNCTIONAL
HEARING - RIGHT EAR: FUNCTIONAL
GROOMING: NEEDS ASSISTANCE
DRESSING YOURSELF: NEEDS ASSISTANCE
JUDGMENT_ADEQUATE_SAFELY_COMPLETE_DAILY_ACTIVITIES: NO
ADEQUATE_TO_COMPLETE_ADL: YES
TOILETING: NEEDS ASSISTANCE
PATIENT'S MEMORY ADEQUATE TO SAFELY COMPLETE DAILY ACTIVITIES?: NO
ASSISTIVE_DEVICE: OTHER (COMMENT)
FEEDING YOURSELF: NEEDS ASSISTANCE
WALKS IN HOME: NEEDS ASSISTANCE
BATHING: NEEDS ASSISTANCE

## 2024-07-23 ASSESSMENT — ENCOUNTER SYMPTOMS: SEIZURES: 1

## 2024-07-23 NOTE — H&P
Elizabeth Garcia is a 79 y.o. female   Seizures        Patient with a past medical history of dementia who lives at a nursing facility comes in with acute change in mental status with possible seizure-like activity  The patient had a similar admission a few months ago when she had an EEG done which was unremarkable  At that time neurology had recommended not starting antiseizure medications and just to treat the infections  The patient is alert x 1-2 she denies any discomfort      Past Medical History  Past Medical History:   Diagnosis Date    Dementia (Multi)     Personal history of other specified conditions 03/18/2015    History of chest pain       Surgical History  No past surgical history on file.     Social History  She has no history on file for tobacco use, alcohol use, and drug use.    Family History  No family history on file.     Allergies  Patient has no known allergies.    Review of Systems   Neurological:  Positive for seizures.          Alert x 1  Denies any pain or discomfort    Vitals:    07/23/24 1543   BP: 121/59   Pulse: 57   Resp: 18   Temp:    SpO2: 90%        Scheduled medications  cefTRIAXone, 1 g, intravenous, Daily  heparin (porcine), 5,000 Units, subcutaneous, q8h  pantoprazole, 40 mg, oral, Daily before breakfast      Continuous medications  sodium chloride 0.9%, 100 mL/hr, Last Rate: 100 mL/hr (07/22/24 2036)      PRN medications  PRN medications: acetaminophen, alum-mag hydroxide-simeth, melatonin, ondansetron    Results from last 7 days   Lab Units 07/23/24  0954 07/22/24  1217   WBC AUTO x10*3/uL 4.4 4.5   HEMOGLOBIN g/dL 11.8* 11.4*   HEMATOCRIT % 38.8 36.8   PLATELETS AUTO x10*3/uL 219 245     Results from last 7 days   Lab Units 07/23/24  0954 07/22/24  1217   SODIUM mmol/L 137 140   POTASSIUM mmol/L 4.5 4.3   CHLORIDE mmol/L 108* 108*   CO2 mmol/L 24 23   BUN mg/dL 13 21   CREATININE mg/dL 0.71 0.76   CALCIUM mg/dL 8.3* 8.5*   PROTEIN TOTAL g/dL  --  7.0   BILIRUBIN TOTAL mg/dL  --   0.3   ALK PHOS U/L  --  72   ALT U/L  --  6*   AST U/L  --  14   GLUCOSE mg/dL 71* 89            CT head wo IV contrast   Final Result   NO ACUTE INTRACRANIAL PROCESS        MACRO:   None        Signed by: Alexander Calderónbret 7/22/2024 1:59 PM   Dictation workstation:   [id number]      XR chest 1 view   Final Result   1.  No evidence of acute cardiopulmonary process.             Signed by: James Gao 7/22/2024 12:57 PM   Dictation workstation:   [id number]          Physical Exam      Constitutional   General appearance: Alert and in no acute distress.   Eyes   Inspection of eyes: Sclera and conjunctiva were normal.    Pupil exam: Pupils were equal in size. Extraocular movements were intact.   Pulmonary   Respiratory assessment: No respiratory distress, normal respiratory rhythm and effort.    Auscultation of Lungs: Clear bilateral breath sounds.   Cardiovascular   Auscultation of heart: Apical pulse normal, heart rate and rhythm normal, normal S1 and S2, no murmurs and no pericardial rub.    Exam for edema: No peripheral edema.   Abdomen   Abdominal Exam: No bruits, normal bowel sounds, soft, non-tender, no abdominal mass palpated.    Liver and Spleen exam: No hepato-splenomegaly.   Musculoskeletal     Inspection of digits and nails: No clubbing or cyanosis of the fingernails.    Inspection/palpation of joints, bones and muscles: No joint swelling.    Skin   Skin inspection: Normal skin color and pigmentation, normal skin turgor and no visible rash.   Neurologic   Cranial nerves: Nerves 2-12 were intact, no focal neuro defects.   Alert x 1      Assessment/Plan      #Urinary tract infection  #Dementia  Started IV antibiotics  Hopefully back to nursing home soon once cultures are back  Similar presentation as last time  EEG was unremarkable then and seizures ruled out

## 2024-07-23 NOTE — ED NOTES
Report called to RN on 6th floor.     When in the room starting antibiotics, strong smell of foul smelling urine. Pt was just checked and her brief was dry. A purewick was in place, no urine in collection container.      Reji Brannon RN  07/22/24 2161

## 2024-07-24 LAB
ANION GAP SERPL CALC-SCNC: 11 MMOL/L (ref 10–20)
BUN SERPL-MCNC: 10 MG/DL (ref 6–23)
CALCIUM SERPL-MCNC: 8.5 MG/DL (ref 8.6–10.3)
CHLORIDE SERPL-SCNC: 105 MMOL/L (ref 98–107)
CO2 SERPL-SCNC: 26 MMOL/L (ref 21–32)
CREAT SERPL-MCNC: 0.77 MG/DL (ref 0.5–1.05)
EGFRCR SERPLBLD CKD-EPI 2021: 79 ML/MIN/1.73M*2
ERYTHROCYTE [DISTWIDTH] IN BLOOD BY AUTOMATED COUNT: 16.3 % (ref 11.5–14.5)
GLUCOSE SERPL-MCNC: 78 MG/DL (ref 74–99)
HCT VFR BLD AUTO: 35.2 % (ref 36–46)
HGB BLD-MCNC: 11 G/DL (ref 12–16)
MCH RBC QN AUTO: 26.5 PG (ref 26–34)
MCHC RBC AUTO-ENTMCNC: 31.3 G/DL (ref 32–36)
MCV RBC AUTO: 85 FL (ref 80–100)
NRBC BLD-RTO: 0 /100 WBCS (ref 0–0)
PLATELET # BLD AUTO: 239 X10*3/UL (ref 150–450)
POTASSIUM SERPL-SCNC: 4.2 MMOL/L (ref 3.5–5.3)
RBC # BLD AUTO: 4.15 X10*6/UL (ref 4–5.2)
SODIUM SERPL-SCNC: 138 MMOL/L (ref 136–145)
WBC # BLD AUTO: 3.9 X10*3/UL (ref 4.4–11.3)

## 2024-07-24 PROCEDURE — 36415 COLL VENOUS BLD VENIPUNCTURE: CPT | Performed by: INTERNAL MEDICINE

## 2024-07-24 PROCEDURE — 2500000004 HC RX 250 GENERAL PHARMACY W/ HCPCS (ALT 636 FOR OP/ED): Performed by: INTERNAL MEDICINE

## 2024-07-24 PROCEDURE — 85027 COMPLETE CBC AUTOMATED: CPT | Performed by: INTERNAL MEDICINE

## 2024-07-24 PROCEDURE — 2500000001 HC RX 250 WO HCPCS SELF ADMINISTERED DRUGS (ALT 637 FOR MEDICARE OP): Performed by: INTERNAL MEDICINE

## 2024-07-24 PROCEDURE — 99231 SBSQ HOSP IP/OBS SF/LOW 25: CPT | Performed by: INTERNAL MEDICINE

## 2024-07-24 PROCEDURE — 1100000001 HC PRIVATE ROOM DAILY

## 2024-07-24 PROCEDURE — 80048 BASIC METABOLIC PNL TOTAL CA: CPT | Performed by: INTERNAL MEDICINE

## 2024-07-24 RX ORDER — HEPARIN SODIUM 5000 [USP'U]/ML
5000 INJECTION, SOLUTION INTRAVENOUS; SUBCUTANEOUS EVERY 8 HOURS
Status: DISCONTINUED | OUTPATIENT
Start: 2024-07-24 | End: 2024-07-25 | Stop reason: HOSPADM

## 2024-07-24 RX ORDER — CEFADROXIL 500 MG/1
500 CAPSULE ORAL EVERY 12 HOURS SCHEDULED
Status: DISCONTINUED | OUTPATIENT
Start: 2024-07-24 | End: 2024-07-25 | Stop reason: HOSPADM

## 2024-07-24 ASSESSMENT — COGNITIVE AND FUNCTIONAL STATUS - GENERAL
DRESSING REGULAR LOWER BODY CLOTHING: A LOT
MOBILITY SCORE: 13
TOILETING: TOTAL
STANDING UP FROM CHAIR USING ARMS: A LOT
DRESSING REGULAR UPPER BODY CLOTHING: A LOT
PERSONAL GROOMING: A LOT
CLIMB 3 TO 5 STEPS WITH RAILING: A LOT
MOVING TO AND FROM BED TO CHAIR: A LOT
EATING MEALS: A LOT
TOILETING: TOTAL
HELP NEEDED FOR BATHING: TOTAL
MOVING FROM LYING ON BACK TO SITTING ON SIDE OF FLAT BED WITH BEDRAILS: A LITTLE
DRESSING REGULAR UPPER BODY CLOTHING: A LOT
WALKING IN HOSPITAL ROOM: A LOT
DAILY ACTIVITIY SCORE: 10
MOVING TO AND FROM BED TO CHAIR: A LOT
PERSONAL GROOMING: A LOT
DAILY ACTIVITIY SCORE: 10
TURNING FROM BACK TO SIDE WHILE IN FLAT BAD: A LOT
STANDING UP FROM CHAIR USING ARMS: A LOT
MOBILITY SCORE: 13
MOVING FROM LYING ON BACK TO SITTING ON SIDE OF FLAT BED WITH BEDRAILS: A LITTLE
TURNING FROM BACK TO SIDE WHILE IN FLAT BAD: A LOT
HELP NEEDED FOR BATHING: TOTAL
CLIMB 3 TO 5 STEPS WITH RAILING: A LOT
EATING MEALS: A LOT
WALKING IN HOSPITAL ROOM: A LOT
DRESSING REGULAR LOWER BODY CLOTHING: A LOT

## 2024-07-24 ASSESSMENT — PAIN SCALES - GENERAL
PAINLEVEL_OUTOF10: 0 - NO PAIN
PAINLEVEL_OUTOF10: 0 - NO PAIN

## 2024-07-24 ASSESSMENT — PAIN - FUNCTIONAL ASSESSMENT
PAIN_FUNCTIONAL_ASSESSMENT: 0-10
PAIN_FUNCTIONAL_ASSESSMENT: 0-10

## 2024-07-24 NOTE — CONSULTS
"Nutrition Assessment Note  Nutrition Assessment      Reason for Assessment  Reason for Assessment: Admission nursing screening  Admitted for dementia. MST score of 2 for weight loss & poor appetite.  Unable to verify weight loss.  Intake is inadequate to meet estimated energy needs.    History:  Food and Nutrient History  Energy Intake: Poor < 50 %  Food and Nutrient History: patient unable to provide any diet information.  Family n/a.    Diagnosis   Urinary tract infection without hematuria, site unspecified   Dementia, unspecified dementia severity, unspecified dementia type, unspecified whether behavioral, psychotic, or mood disturbance or anxiety (Multi)     Anthropometrics:  Height: 160 cm (5' 2.99\")  Weight: 69.6 kg (153 lb 6.4 oz)  BMI (Calculated): 27.18  Weight Change  Weight History / % Weight Change: 5/3/24: 69.1 kg  Significant Weight Loss: No  IBW/kg (Dietitian Calculated): 52.3 kg   Amputation Calculations:  BMI Amputation Adjustment: No    Energy Needs:  Calculated Energy Needs Using Equations  Height: 160 cm (5' 2.99\")  Temp: 35.7 °C (96.3 °F)    Estimated Energy Needs  Method for Estimating Needs: 8464-3765 # 23-25 kcal/kg    Estimated Protein Needs  Method for Estimating Needs: 52-63 @ 1.0-1.2 gr/kg IBW    Estimated Fluid Needs  Total Fluid Estimated Needs (mL): 1300 mL  Total Fluid Estimated Needs (mL/kg): 25 mL/kg     Dietary Orders   Adult diet Cardiac; 70 gm fat; 2 - 3 grams Sodium       Nutrition Focused Physical Findings:  Subcutaneous Fat Loss  Orbital Fat Pads: Mild-Moderate (slight dark circles and slight hollowing)  Buccal Fat Pads: Mild-Moderate (flat cheeks, minimal bounce)    Muscle Wasting  Temporalis: Severe (hollowed scooping depression)  Pectoralis (Clavicular Region): Severe (protruding prominent clavicle)  Deltoid/Trapezius: Mild-Moderate (slight protrusion of acromion process)  Interosseous: Severe (depressed area between thumb and forefinger)        Latest Reference Range & " Units 07/24/24 06:05   GLUCOSE 74 - 99 mg/dL 78   SODIUM 136 - 145 mmol/L 138   POTASSIUM 3.5 - 5.3 mmol/L 4.2   CHLORIDE 98 - 107 mmol/L 105   Bicarbonate 21 - 32 mmol/L 26   Anion Gap 10 - 20 mmol/L 11   Blood Urea Nitrogen 6 - 23 mg/dL 10   Creatinine 0.50 - 1.05 mg/dL 0.77   EGFR >60 mL/min/1.73m*2 79   Calcium 8.6 - 10.3 mg/dL 8.5 (L)   (L): Data is abnormally low     Nutrition Diagnosis   Malnutrition Diagnosis  Patient has Malnutrition Diagnosis: No  Additional Assessment Information: suspect  severe chronic PCM, but unable to verify intake for past month and no weight loss over past 2 months.    Patient has Nutrition Diagnosis: Yes  Nutrition Diagnosis 1: Inadequate protein energy intake  Diagnosis Status (1): New  Related to (1): cognative impairment  As Evidenced by (1): intake <25% of meals.      Nutrition Interventions/Recommendations   Nutrition Prescription  Individualized Nutrition Prescription Provided for : Liberate diet to regular, soft & bite size to make feeding herself easier.  Start Ensure Plus BID.  Staff assistance with meals.  Daily MVI.  IF intake remains<50% of meals, consider appetite stimulant.    Food and/or Nutrient Delivery Interventions  Meals and Snacks: General healthful diet  Goal: intake meets >75% estimated nutrient needs.   Medical Food Supplement: Commercial beverage     Nutrition Monitoring and Evaluation   Food and Nutrient Related History   Amount of Food: Estimated amout of food  Criteria: intake >75% of meals     Anthropometrics: Body Composition/Growth/Weight History  Weight: Measured weight  Criteria: daily  Weight Change: Weight change percentage  Criteria: weekly    Biochemical Data, Medical Tests and Procedures  Electrolyte and Renal Panel: BUN, Calcium, serum, Chloride, Creatinine, Magnesium, Phosphorus, Potassium, Sodium  Criteria: as indicated  Nutritional Anemia Profile: Folate, serum, Hematocrit, Hemoglobin, Iron, serum  Criteria: as indicated    Nutrition  Focused Physical Findings   Digestive System: Anorexia, Constipation, Diarrhea, Early satiety, Nausea, Vomiting  Criteria: daily    Follow Up  Last Date of Nutrition Visit: 07/24/24  Nutrition Follow-Up Needed?: Dietitian to reassess per policy  Follow up Comment: Poor intake-TR

## 2024-07-24 NOTE — PROGRESS NOTES
07/24/24 0846   Discharge Planning   Living Arrangements   (lives at [facility]/memory care unit [phone] for nursing report)   Support Systems Children   Assistance Needed TCC spoke to  Rae Dwyer 7/23/2024 to confiirm if any barriers to return; patient will be able to return back to [facility]  [phone] updated about patient;s admission to hospital clinals faxed [phone]   Type of Residence Assisted living  (memory care unit)   Care Facility Name [facility]-memory care unit   Home or Post Acute Services Post acute facilities (Rehab/SNF/etc)   Type of Post Acute Facility Services Assisted living   Expected Discharge Disposition [facility]   Does the patient need discharge transport arranged? Yes   RoundTrip coordination needed? Yes   Has discharge transport been arranged? No     Spoke to patient's son, Tomer Garcia -when patient is medically ready to return he will provide  transportation back to facility

## 2024-07-24 NOTE — PROGRESS NOTES
Elizabeth Garcia is a 79 y.o. female     Patient laying comfortably in bed  No medical changes reported by staff    Review of Systems           Vitals:    07/24/24 0729   BP: 134/69   Pulse: 65   Resp: 20   Temp: 36.8 °C (98.3 °F)   SpO2: 100%        Scheduled medications  cefTRIAXone, 1 g, intravenous, Daily  heparin (porcine), 5,000 Units, subcutaneous, q8h  pantoprazole, 40 mg, oral, Daily before breakfast      Continuous medications     PRN medications  PRN medications: acetaminophen, alum-mag hydroxide-simeth, melatonin, ondansetron    Lab Review   Results from last 7 days   Lab Units 07/24/24  0605 07/23/24  0954 07/22/24  1217   WBC AUTO x10*3/uL 3.9* 4.4 4.5   HEMOGLOBIN g/dL 11.0* 11.8* 11.4*   HEMATOCRIT % 35.2* 38.8 36.8   PLATELETS AUTO x10*3/uL 239 219 245     Results from last 7 days   Lab Units 07/24/24  0605 07/23/24  0954 07/22/24  1217   SODIUM mmol/L 138 137 140   POTASSIUM mmol/L 4.2 4.5 4.3   CHLORIDE mmol/L 105 108* 108*   CO2 mmol/L 26 24 23   BUN mg/dL 10 13 21   CREATININE mg/dL 0.77 0.71 0.76   CALCIUM mg/dL 8.5* 8.3* 8.5*   PROTEIN TOTAL g/dL  --   --  7.0   BILIRUBIN TOTAL mg/dL  --   --  0.3   ALK PHOS U/L  --   --  72   ALT U/L  --   --  6*   AST U/L  --   --  14   GLUCOSE mg/dL 78 71* 89            CT head wo IV contrast   Final Result   NO ACUTE INTRACRANIAL PROCESS        MACRO:   None        Signed by: Alexander Sage 7/22/2024 1:59 PM   Dictation workstation:   [id number]      XR chest 1 view   Final Result   1.  No evidence of acute cardiopulmonary process.             Signed by: James Gao 7/22/2024 12:57 PM   Dictation workstation:   [id number]            Physical Exam    Constitutional   General appearance: Alert to verbal stimuli  Pulmonary   Respiratory assessment: No respiratory distress, normal respiratory rhythm and effort.    Auscultation of Lungs: Clear bilateral breath sounds.   Cardiovascular   Auscultation of heart: Apical pulse normal, heart rate and rhythm normal,  normal S1 and S2, no murmurs and no pericardial rub.    Exam for edema: No peripheral edema.   Abdomen   Abdominal Exam: No bruits, normal bowel sounds, soft, non-tender, no abdominal mass palpated.    Liver and Spleen exam: No hepato-splenomegaly.   Musculoskeletal   Moves extremities  Neurologic   Alert x 1        Assessment/Plan      #UTI  Continue IV Rocephin  Cultures pending  Will switch to oral antibiotics once cultures back    #Dementia  At her baseline

## 2024-07-24 NOTE — DISCHARGE INSTRUCTIONS
Please take antibiotic as prescribed.    Return to ED if you experience any new fevers, chills, chest pain, shortness of breath, abdominal pain, nausea, vomiting, weakness, lightheadedness, lethargy.

## 2024-07-25 VITALS
OXYGEN SATURATION: 95 % | HEART RATE: 89 BPM | DIASTOLIC BLOOD PRESSURE: 54 MMHG | WEIGHT: 153.4 LBS | HEIGHT: 63 IN | TEMPERATURE: 98.4 F | RESPIRATION RATE: 16 BRPM | BODY MASS INDEX: 27.18 KG/M2 | SYSTOLIC BLOOD PRESSURE: 144 MMHG

## 2024-07-25 LAB
ANION GAP SERPL CALC-SCNC: 16 MMOL/L (ref 10–20)
BACTERIA UR CULT: ABNORMAL
BUN SERPL-MCNC: 14 MG/DL (ref 6–23)
CALCIUM SERPL-MCNC: 8.5 MG/DL (ref 8.6–10.3)
CHLORIDE SERPL-SCNC: 104 MMOL/L (ref 98–107)
CO2 SERPL-SCNC: 22 MMOL/L (ref 21–32)
CREAT SERPL-MCNC: 0.81 MG/DL (ref 0.5–1.05)
EGFRCR SERPLBLD CKD-EPI 2021: 74 ML/MIN/1.73M*2
ERYTHROCYTE [DISTWIDTH] IN BLOOD BY AUTOMATED COUNT: 16.2 % (ref 11.5–14.5)
GLUCOSE SERPL-MCNC: 97 MG/DL (ref 74–99)
HCT VFR BLD AUTO: 38.1 % (ref 36–46)
HGB BLD-MCNC: 12.1 G/DL (ref 12–16)
MCH RBC QN AUTO: 26.5 PG (ref 26–34)
MCHC RBC AUTO-ENTMCNC: 31.8 G/DL (ref 32–36)
MCV RBC AUTO: 84 FL (ref 80–100)
NRBC BLD-RTO: 0 /100 WBCS (ref 0–0)
PLATELET # BLD AUTO: 249 X10*3/UL (ref 150–450)
POTASSIUM SERPL-SCNC: 4.3 MMOL/L (ref 3.5–5.3)
RBC # BLD AUTO: 4.56 X10*6/UL (ref 4–5.2)
SODIUM SERPL-SCNC: 138 MMOL/L (ref 136–145)
WBC # BLD AUTO: 4.7 X10*3/UL (ref 4.4–11.3)

## 2024-07-25 PROCEDURE — 80048 BASIC METABOLIC PNL TOTAL CA: CPT | Performed by: INTERNAL MEDICINE

## 2024-07-25 PROCEDURE — 2500000001 HC RX 250 WO HCPCS SELF ADMINISTERED DRUGS (ALT 637 FOR MEDICARE OP): Performed by: INTERNAL MEDICINE

## 2024-07-25 PROCEDURE — 36415 COLL VENOUS BLD VENIPUNCTURE: CPT | Performed by: INTERNAL MEDICINE

## 2024-07-25 PROCEDURE — 85027 COMPLETE CBC AUTOMATED: CPT | Performed by: INTERNAL MEDICINE

## 2024-07-25 PROCEDURE — 2500000004 HC RX 250 GENERAL PHARMACY W/ HCPCS (ALT 636 FOR OP/ED): Performed by: INTERNAL MEDICINE

## 2024-07-25 PROCEDURE — 99239 HOSP IP/OBS DSCHRG MGMT >30: CPT | Performed by: INTERNAL MEDICINE

## 2024-07-25 RX ORDER — CIPROFLOXACIN 500 MG/1
500 TABLET ORAL 2 TIMES DAILY
Qty: 10 TABLET | Refills: 0 | Status: SHIPPED | OUTPATIENT
Start: 2024-07-25 | End: 2024-07-30

## 2024-07-25 NOTE — PROGRESS NOTES
07/25/24 0926   Discharge Planning   Assistance Needed spoke to [facility]-any new script patient will need a script-as her pharmacy is [facility]-Attending/PA updated   Care Facility Name [facility]-memory care unit   Expected Discharge Disposition [facility]   Does the patient need discharge transport arranged? No  (patient son will provide transport back to facility Tomer Garcia [phone])

## 2024-07-25 NOTE — DISCHARGE SUMMARY
Discharge Diagnosis  Dementia, unspecified dementia severity, unspecified dementia type, unspecified whether behavioral, psychotic, or mood disturbance or anxiety (Multi)    Issues Requiring Follow-Up  Follow-up with PCP    Test Results Pending At Discharge  Pending Labs       Order Current Status    Extra Urine Gray Tube Collected (07/22/24 8187)    Urinalysis with Reflex Culture and Microscopic In process            Hospital Course   With a past medical history significant for dementia was admitted with change in mental status and UTI  Has had multiple admissions for similar reasons  Patient improved significantly with IV antibiotics  Cultures show Pseudomonas aeruginosa which is multi drug sensitive  Switch the patient to oral ciprofloxacin  Discharged in stable condition    Pertinent Physical Exam At Time of Discharge  Physical Exam    Constitutional   General appearance: Alert and in no acute distress.     Pulmonary   Respiratory assessment: No respiratory distress, normal respiratory rhythm and effort.    Auscultation of Lungs: Clear bilateral breath sounds.   Cardiovascular   Auscultation of heart: Apical pulse normal, heart rate and rhythm normal, normal S1 and S2, no murmurs and no pericardial rub.    Exam for edema: No peripheral edema.   Abdomen   Abdominal Exam: No bruits, normal bowel sounds, soft, non-tender, no abdominal mass palpated.    Liver and Spleen exam: No hepato-splenomegaly.   Musculoskeletal     Inspection of digits and nails: No clubbing or cyanosis of the fingernails.    Inspection/palpation of joints, bones and muscles: No joint swelling. Normal movement of all extremities.   Skin   Skin inspection: Normal skin color and pigmentation, normal skin turgor and no visible rash.   Neurologic   Cranial nerves: Nerves 2-12 were intact, no focal neuro defects.    Home Medications     Medication List      START taking these medications     ciprofloxacin 500 mg tablet; Commonly known as: Cipro;  Take 1 tablet   (500 mg) by mouth 2 times a day for 5 days.     CONTINUE taking these medications     cetirizine 10 mg tablet; Commonly known as: ZyrTEC   Eucerin Intensive Repair lotion; Generic drug: emollient combination   no.110   guaiFENesin 100 mg/5 mL syrup; Commonly known as: Robitussin   ICY [facility]       Outpatient Follow-Up  No future appointments.    Patient seen at bedside. Events from the last visit reviewed. Discussed with staff. Results of tests and investigations from last visit reviewed and discussed with patient/Family. Electronic chart on [facility] reviewed. Input / Recommendations  from consultants  appreciated and reviewed and agreed with.     discharge summary and profile completed. medications reviewed and discussed with patient and family.  scripts completed and signed.     total discharge time in excess of 30 minutes.    Aly Monroe MD

## 2024-07-25 NOTE — CARE PLAN
The patient's goals for the shift include      The clinical goals for the shift include pt will remain free from injury by end of shift    Problem: Fall/Injury  Goal: Not fall by end of shift  Outcome: Progressing  Goal: Be free from injury by end of the shift  Outcome: Progressing  Goal: Verbalize understanding of personal risk factors for fall in the hospital  Outcome: Progressing  Goal: Verbalize understanding of risk factor reduction measures to prevent injury from fall in the home  Outcome: Progressing  Goal: Use assistive devices by end of the shift  Outcome: Progressing  Goal: Pace activities to prevent fatigue by end of the shift  Outcome: Progressing     Problem: Skin  Goal: Prevent/manage excess moisture  Outcome: Progressing  Goal: Prevent/minimize sheer/friction injuries  Outcome: Progressing  Goal: Promote/optimize nutrition  Outcome: Progressing  Goal: Promote skin healing  Outcome: Progressing

## 2024-07-27 LAB
ATRIAL RATE: 76 BPM
P AXIS: 64 DEGREES
P OFFSET: 156 MS
P ONSET: 125 MS
PR INTERVAL: 190 MS
Q ONSET: 220 MS
QRS COUNT: 13 BEATS
QRS DURATION: 62 MS
QT INTERVAL: 404 MS
QTC CALCULATION(BAZETT): 454 MS
QTC FREDERICIA: 436 MS
R AXIS: 26 DEGREES
T AXIS: 44 DEGREES
T OFFSET: 422 MS
VENTRICULAR RATE: 76 BPM

## 2024-07-29 NOTE — DOCUMENTATION CLARIFICATION NOTE
"    PATIENT:               RENUKA SHELBY  ACCT #:                  [id number]  MRN:                       [id number]  :                       1945  ADMIT DATE:       2024 11:38 AM  DISCH DATE:        2024 2:24 PM  RESPONDING PROVIDER #:        [id number]          PROVIDER RESPONSE TEXT:    Metabolic encephalopathy 2/2 Pseudomonas UTI    CDI QUERY TEXT:    Clarification      Instruction:    Based on your assessment of the patient and the clinical information, please provide the requested documentation by clicking on the appropriate radio button and enter any additional information if prompted.    Question: Please further clarify the most likely etiology of the altered mental status as    When answering this query, please exercise your independent professional judgment. The fact that a question is being asked, does not imply that any particular answer is desired or expected.    The patient's clinical indicators include:  Clinical Information: Presents with acute change in mental status    Clinical Indicators:  -ED physical exam: \"A&Ox1, no acute distress, appears comfortable. Confused, at baseline. Somewhat follows commands, also baseline. No focal findings identified. Moving all extremities.\"    -24 UCX: Pseudomonas aeruginosa    Treatment: IV cefTRIAXone -24-24-changed to oral v on 24    Risk Factors: NH patient, Dementia, infection  Options provided:  -- Metabolic encephalopathy 2/2 Pseudomonas UTI  -- Altered mental status is baseline due to dementia  -- Other - I will add my own diagnosis  -- Refer to Clinical Documentation Reviewer    Query created by: Nai Caba on 2024 1:18 PM      Electronically signed by:  YANE JIMENEZ MD 2024 10:01 AM          "

## 2025-02-18 ENCOUNTER — HOSPITAL ENCOUNTER (INPATIENT)
Facility: HOSPITAL | Age: 80
LOS: 3 days | Discharge: HOME | DRG: 101 | End: 2025-02-21
Attending: EMERGENCY MEDICINE | Admitting: FAMILY MEDICINE
Payer: MEDICARE

## 2025-02-18 ENCOUNTER — APPOINTMENT (OUTPATIENT)
Dept: CARDIOLOGY | Facility: HOSPITAL | Age: 80
DRG: 101 | End: 2025-02-18
Payer: MEDICARE

## 2025-02-18 ENCOUNTER — APPOINTMENT (OUTPATIENT)
Dept: NEUROLOGY | Facility: HOSPITAL | Age: 80
DRG: 101 | End: 2025-02-18
Payer: MEDICARE

## 2025-02-18 ENCOUNTER — APPOINTMENT (OUTPATIENT)
Dept: RADIOLOGY | Facility: HOSPITAL | Age: 80
DRG: 101 | End: 2025-02-18
Payer: MEDICARE

## 2025-02-18 DIAGNOSIS — R56.9 SEIZURE (MULTI): Primary | ICD-10-CM

## 2025-02-18 DIAGNOSIS — F03.90 DEMENTIA, UNSPECIFIED DEMENTIA SEVERITY, UNSPECIFIED DEMENTIA TYPE, UNSPECIFIED WHETHER BEHAVIORAL, PSYCHOTIC, OR MOOD DISTURBANCE OR ANXIETY (MULTI): ICD-10-CM

## 2025-02-18 LAB
ALBUMIN SERPL BCP-MCNC: 3.7 G/DL (ref 3.4–5)
ALP SERPL-CCNC: 75 U/L (ref 33–136)
ALT SERPL W P-5'-P-CCNC: 9 U/L (ref 7–45)
ANION GAP SERPL CALC-SCNC: 11 MMOL/L (ref 10–20)
AST SERPL W P-5'-P-CCNC: 17 U/L (ref 9–39)
BASOPHILS # BLD AUTO: 0.02 X10*3/UL (ref 0–0.1)
BASOPHILS NFR BLD AUTO: 0.5 %
BILIRUB SERPL-MCNC: 0.4 MG/DL (ref 0–1.2)
BUN SERPL-MCNC: 18 MG/DL (ref 6–23)
CALCIUM SERPL-MCNC: 8.7 MG/DL (ref 8.6–10.3)
CHLORIDE SERPL-SCNC: 108 MMOL/L (ref 98–107)
CO2 SERPL-SCNC: 26 MMOL/L (ref 21–32)
CREAT SERPL-MCNC: 0.81 MG/DL (ref 0.5–1.05)
EGFRCR SERPLBLD CKD-EPI 2021: 74 ML/MIN/1.73M*2
EOSINOPHIL # BLD AUTO: 0.11 X10*3/UL (ref 0–0.4)
EOSINOPHIL NFR BLD AUTO: 2.9 %
ERYTHROCYTE [DISTWIDTH] IN BLOOD BY AUTOMATED COUNT: 15 % (ref 11.5–14.5)
GLUCOSE SERPL-MCNC: 83 MG/DL (ref 74–99)
HCT VFR BLD AUTO: 39.5 % (ref 36–46)
HGB BLD-MCNC: 12.7 G/DL (ref 12–16)
IMM GRANULOCYTES # BLD AUTO: 0.01 X10*3/UL (ref 0–0.5)
IMM GRANULOCYTES NFR BLD AUTO: 0.3 % (ref 0–0.9)
LACTATE SERPL-SCNC: 0.8 MMOL/L (ref 0.4–2)
LACTATE SERPL-SCNC: 2.5 MMOL/L (ref 0.4–2)
LYMPHOCYTES # BLD AUTO: 1.41 X10*3/UL (ref 0.8–3)
LYMPHOCYTES NFR BLD AUTO: 37.2 %
MAGNESIUM SERPL-MCNC: 1.97 MG/DL (ref 1.6–2.4)
MCH RBC QN AUTO: 26.9 PG (ref 26–34)
MCHC RBC AUTO-ENTMCNC: 32.2 G/DL (ref 32–36)
MCV RBC AUTO: 84 FL (ref 80–100)
MONOCYTES # BLD AUTO: 0.21 X10*3/UL (ref 0.05–0.8)
MONOCYTES NFR BLD AUTO: 5.5 %
NEUTROPHILS # BLD AUTO: 2.03 X10*3/UL (ref 1.6–5.5)
NEUTROPHILS NFR BLD AUTO: 53.6 %
NRBC BLD-RTO: 0 /100 WBCS (ref 0–0)
P OFFSET: 153 MS
P ONSET: 120 MS
PLATELET # BLD AUTO: 209 X10*3/UL (ref 150–450)
POTASSIUM SERPL-SCNC: 4.2 MMOL/L (ref 3.5–5.3)
PROT SERPL-MCNC: 6.8 G/DL (ref 6.4–8.2)
Q ONSET: 222 MS
QRS COUNT: 11 BEATS
QRS DURATION: 64 MS
QT INTERVAL: 398 MS
QTC CALCULATION(BAZETT): 423 MS
QTC FREDERICIA: 415 MS
R AXIS: 9 DEGREES
RBC # BLD AUTO: 4.72 X10*6/UL (ref 4–5.2)
SODIUM SERPL-SCNC: 141 MMOL/L (ref 136–145)
T AXIS: 21 DEGREES
T OFFSET: 421 MS
VENTRICULAR RATE: 68 BPM
WBC # BLD AUTO: 3.8 X10*3/UL (ref 4.4–11.3)

## 2025-02-18 PROCEDURE — 83605 ASSAY OF LACTIC ACID: CPT | Performed by: EMERGENCY MEDICINE

## 2025-02-18 PROCEDURE — 2550000001 HC RX 255 CONTRASTS: Performed by: FAMILY MEDICINE

## 2025-02-18 PROCEDURE — 99285 EMERGENCY DEPT VISIT HI MDM: CPT | Mod: 25 | Performed by: EMERGENCY MEDICINE

## 2025-02-18 PROCEDURE — 2500000001 HC RX 250 WO HCPCS SELF ADMINISTERED DRUGS (ALT 637 FOR MEDICARE OP): Performed by: NURSE PRACTITIONER

## 2025-02-18 PROCEDURE — 96372 THER/PROPH/DIAG INJ SC/IM: CPT | Performed by: NURSE PRACTITIONER

## 2025-02-18 PROCEDURE — 85025 COMPLETE CBC W/AUTO DIFF WBC: CPT | Performed by: EMERGENCY MEDICINE

## 2025-02-18 PROCEDURE — 96365 THER/PROPH/DIAG IV INF INIT: CPT | Mod: 59

## 2025-02-18 PROCEDURE — 70553 MRI BRAIN STEM W/O & W/DYE: CPT

## 2025-02-18 PROCEDURE — 93005 ELECTROCARDIOGRAM TRACING: CPT

## 2025-02-18 PROCEDURE — 1100000001 HC PRIVATE ROOM DAILY

## 2025-02-18 PROCEDURE — 95816 EEG AWAKE AND DROWSY: CPT

## 2025-02-18 PROCEDURE — 36415 COLL VENOUS BLD VENIPUNCTURE: CPT | Performed by: EMERGENCY MEDICINE

## 2025-02-18 PROCEDURE — 2500000004 HC RX 250 GENERAL PHARMACY W/ HCPCS (ALT 636 FOR OP/ED): Performed by: EMERGENCY MEDICINE

## 2025-02-18 PROCEDURE — 96361 HYDRATE IV INFUSION ADD-ON: CPT

## 2025-02-18 PROCEDURE — 99222 1ST HOSP IP/OBS MODERATE 55: CPT | Performed by: STUDENT IN AN ORGANIZED HEALTH CARE EDUCATION/TRAINING PROGRAM

## 2025-02-18 PROCEDURE — 84075 ASSAY ALKALINE PHOSPHATASE: CPT | Performed by: EMERGENCY MEDICINE

## 2025-02-18 PROCEDURE — 2500000004 HC RX 250 GENERAL PHARMACY W/ HCPCS (ALT 636 FOR OP/ED): Performed by: NURSE PRACTITIONER

## 2025-02-18 PROCEDURE — 95816 EEG AWAKE AND DROWSY: CPT | Performed by: PSYCHIATRY & NEUROLOGY

## 2025-02-18 PROCEDURE — A9575 INJ GADOTERATE MEGLUMI 0.1ML: HCPCS | Performed by: FAMILY MEDICINE

## 2025-02-18 PROCEDURE — 2500000004 HC RX 250 GENERAL PHARMACY W/ HCPCS (ALT 636 FOR OP/ED)

## 2025-02-18 PROCEDURE — 83735 ASSAY OF MAGNESIUM: CPT | Performed by: EMERGENCY MEDICINE

## 2025-02-18 PROCEDURE — 96375 TX/PRO/DX INJ NEW DRUG ADDON: CPT

## 2025-02-18 RX ORDER — GADOTERATE MEGLUMINE 376.9 MG/ML
14 INJECTION INTRAVENOUS
Status: COMPLETED | OUTPATIENT
Start: 2025-02-18 | End: 2025-02-18

## 2025-02-18 RX ORDER — MENTHOL 1.4 %
ADHESIVE PATCH, MEDICATED TOPICAL
COMMUNITY
End: 2025-02-21 | Stop reason: HOSPADM

## 2025-02-18 RX ORDER — ENOXAPARIN SODIUM 100 MG/ML
40 INJECTION SUBCUTANEOUS EVERY 24 HOURS
Status: DISCONTINUED | OUTPATIENT
Start: 2025-02-18 | End: 2025-02-21 | Stop reason: HOSPADM

## 2025-02-18 RX ORDER — DOCUSATE SODIUM 100 MG/1
100 CAPSULE, LIQUID FILLED ORAL 2 TIMES DAILY
Status: DISCONTINUED | OUTPATIENT
Start: 2025-02-18 | End: 2025-02-21 | Stop reason: HOSPADM

## 2025-02-18 RX ORDER — LACOSAMIDE 50 MG/1
100 TABLET ORAL EVERY 12 HOURS SCHEDULED
Status: DISCONTINUED | OUTPATIENT
Start: 2025-02-18 | End: 2025-02-21 | Stop reason: HOSPADM

## 2025-02-18 RX ORDER — ONDANSETRON 4 MG/1
4 TABLET, FILM COATED ORAL EVERY 8 HOURS PRN
Status: DISCONTINUED | OUTPATIENT
Start: 2025-02-18 | End: 2025-02-21 | Stop reason: HOSPADM

## 2025-02-18 RX ORDER — ONDANSETRON 4 MG/1
4 TABLET, ORALLY DISINTEGRATING ORAL 3 TIMES DAILY PRN
COMMUNITY

## 2025-02-18 RX ORDER — ACETAMINOPHEN 650 MG/1
650 SUPPOSITORY RECTAL EVERY 4 HOURS PRN
Status: DISCONTINUED | OUTPATIENT
Start: 2025-02-18 | End: 2025-02-21 | Stop reason: HOSPADM

## 2025-02-18 RX ORDER — ONDANSETRON HYDROCHLORIDE 2 MG/ML
4 INJECTION, SOLUTION INTRAVENOUS EVERY 8 HOURS PRN
Status: DISCONTINUED | OUTPATIENT
Start: 2025-02-18 | End: 2025-02-21 | Stop reason: HOSPADM

## 2025-02-18 RX ORDER — LORAZEPAM 2 MG/ML
INJECTION INTRAMUSCULAR
Status: COMPLETED
Start: 2025-02-18 | End: 2025-02-18

## 2025-02-18 RX ORDER — PANTOPRAZOLE SODIUM 40 MG/1
40 TABLET, DELAYED RELEASE ORAL
Status: DISCONTINUED | OUTPATIENT
Start: 2025-02-19 | End: 2025-02-21 | Stop reason: HOSPADM

## 2025-02-18 RX ORDER — LEVETIRACETAM 10 MG/ML
1000 INJECTION INTRAVASCULAR ONCE
Status: COMPLETED | OUTPATIENT
Start: 2025-02-18 | End: 2025-02-18

## 2025-02-18 RX ORDER — ACETAMINOPHEN 325 MG/1
650 TABLET ORAL EVERY 4 HOURS PRN
Status: DISCONTINUED | OUTPATIENT
Start: 2025-02-18 | End: 2025-02-21 | Stop reason: HOSPADM

## 2025-02-18 RX ORDER — LORAZEPAM 2 MG/ML
2 INJECTION INTRAMUSCULAR ONCE
Status: COMPLETED | OUTPATIENT
Start: 2025-02-18 | End: 2025-02-18

## 2025-02-18 RX ORDER — POLYETHYLENE GLYCOL 3350 17 G/17G
17 POWDER, FOR SOLUTION ORAL DAILY PRN
Status: DISCONTINUED | OUTPATIENT
Start: 2025-02-18 | End: 2025-02-21 | Stop reason: HOSPADM

## 2025-02-18 RX ORDER — LOPERAMIDE HCL 2 MG
2 TABLET ORAL 4 TIMES DAILY PRN
COMMUNITY

## 2025-02-18 RX ORDER — ACETAMINOPHEN 160 MG/5ML
650 SOLUTION ORAL EVERY 4 HOURS PRN
Status: DISCONTINUED | OUTPATIENT
Start: 2025-02-18 | End: 2025-02-21 | Stop reason: HOSPADM

## 2025-02-18 RX ADMIN — LEVETIRACETAM 1000 MG: 10 INJECTION INTRAVENOUS at 13:06

## 2025-02-18 RX ADMIN — DOCUSATE SODIUM 100 MG: 100 CAPSULE, LIQUID FILLED ORAL at 21:28

## 2025-02-18 RX ADMIN — ENOXAPARIN SODIUM 40 MG: 40 INJECTION SUBCUTANEOUS at 12:32

## 2025-02-18 RX ADMIN — LORAZEPAM 2 MG: 2 INJECTION INTRAMUSCULAR at 13:05

## 2025-02-18 RX ADMIN — GADOTERATE MEGLUMINE 14 ML: 376.9 INJECTION INTRAVENOUS at 18:55

## 2025-02-18 RX ADMIN — SODIUM CHLORIDE 500 ML: 9 INJECTION, SOLUTION INTRAVENOUS at 12:12

## 2025-02-18 RX ADMIN — LORAZEPAM 2 MG: 2 INJECTION INTRAMUSCULAR; INTRAVENOUS at 13:05

## 2025-02-18 RX ADMIN — LACOSAMIDE 100 MG: 50 TABLET, FILM COATED ORAL at 21:28

## 2025-02-18 ASSESSMENT — ACTIVITIES OF DAILY LIVING (ADL): LACK_OF_TRANSPORTATION: PATIENT UNABLE TO ANSWER

## 2025-02-18 ASSESSMENT — COGNITIVE AND FUNCTIONAL STATUS - GENERAL
TURNING FROM BACK TO SIDE WHILE IN FLAT BAD: TOTAL
MOVING TO AND FROM BED TO CHAIR: TOTAL
MOBILITY SCORE: 6
HELP NEEDED FOR BATHING: TOTAL
EATING MEALS: TOTAL
WALKING IN HOSPITAL ROOM: TOTAL
CLIMB 3 TO 5 STEPS WITH RAILING: TOTAL
DRESSING REGULAR UPPER BODY CLOTHING: TOTAL
TOILETING: TOTAL
MOVING FROM LYING ON BACK TO SITTING ON SIDE OF FLAT BED WITH BEDRAILS: TOTAL
STANDING UP FROM CHAIR USING ARMS: TOTAL
DAILY ACTIVITIY SCORE: 6
DRESSING REGULAR LOWER BODY CLOTHING: TOTAL
PERSONAL GROOMING: TOTAL

## 2025-02-18 ASSESSMENT — PAIN - FUNCTIONAL ASSESSMENT: PAIN_FUNCTIONAL_ASSESSMENT: CPOT (CRITICAL CARE PAIN OBSERVATION TOOL)

## 2025-02-18 NOTE — H&P
History Of Present Illness  Elizabeth Garcia is a 79 y.o. female presenting with Seizure like activity. Patient herself is only alert to self. She does have significant dementia. She is unable to provide any information as to why she is here. Per triage note there is concern she had 30 seconds of seizure like activity at her nursing facility.   She was seen by neurology here 5/3/24 for seizure like activity but EEG was negative, just showed moderate to severe encephalopathy. She was not recommended to start AED at that time. She also was admitted to [facility] 10/2024 for seizure like activity as well. Per neuro note at that time it seems that provider was told there was no witnessed seizure like activity. EEG was also negative at that time as well.       PMHX: dementia, seizure like activity      Past Medical History  Past Medical History:   Diagnosis Date    Dementia (Multi)     Personal history of other specified conditions 03/18/2015    History of chest pain       Surgical History  No past surgical history on file.     Social History  She has no history on file for tobacco use, alcohol use, and drug use.    Family History  No family history on file.     Allergies  Patient has no known allergies.    Review of Systems   Unable to perform ROS: Dementia        Physical Exam  Constitutional:       Appearance: Normal appearance.   Cardiovascular:      Rate and Rhythm: Normal rate and regular rhythm.      Pulses: Normal pulses.      Heart sounds: Normal heart sounds. No murmur heard.     No gallop.   Pulmonary:      Effort: Pulmonary effort is normal. No respiratory distress.      Breath sounds: Normal breath sounds. No wheezing or rhonchi.   Abdominal:      General: Abdomen is flat. Bowel sounds are normal. There is no distension.      Palpations: Abdomen is soft.      Tenderness: There is no abdominal tenderness. There is no guarding.   Skin:     General: Skin is warm.      Capillary Refill: Capillary refill takes less than 2  "seconds.   Neurological:      Mental Status: She is alert. She is disoriented.   Psychiatric:         Cognition and Memory: Cognition is impaired. Memory is impaired.          Last Recorded Vitals  Blood pressure 123/51, pulse 53, temperature 36.9 °C (98.4 °F), temperature source Temporal, resp. rate 18, height 1.575 m (5' 2\"), weight 69.4 kg (153 lb), SpO2 99%.    Relevant Results  Scheduled medications    Continuous medications    PRN medications      Results for orders placed or performed during the hospital encounter of 02/18/25 (from the past 24 hours)   CBC and Auto Differential   Result Value Ref Range    WBC 3.8 (L) 4.4 - 11.3 x10*3/uL    nRBC 0.0 0.0 - 0.0 /100 WBCs    RBC 4.72 4.00 - 5.20 x10*6/uL    Hemoglobin 12.7 12.0 - 16.0 g/dL    Hematocrit 39.5 36.0 - 46.0 %    MCV 84 80 - 100 fL    MCH 26.9 26.0 - 34.0 pg    MCHC 32.2 32.0 - 36.0 g/dL    RDW 15.0 (H) 11.5 - 14.5 %    Platelets 209 150 - 450 x10*3/uL    Neutrophils % 53.6 40.0 - 80.0 %    Immature Granulocytes %, Automated 0.3 0.0 - 0.9 %    Lymphocytes % 37.2 13.0 - 44.0 %    Monocytes % 5.5 2.0 - 10.0 %    Eosinophils % 2.9 0.0 - 6.0 %    Basophils % 0.5 0.0 - 2.0 %    Neutrophils Absolute 2.03 1.60 - 5.50 x10*3/uL    Immature Granulocytes Absolute, Automated 0.01 0.00 - 0.50 x10*3/uL    Lymphocytes Absolute 1.41 0.80 - 3.00 x10*3/uL    Monocytes Absolute 0.21 0.05 - 0.80 x10*3/uL    Eosinophils Absolute 0.11 0.00 - 0.40 x10*3/uL    Basophils Absolute 0.02 0.00 - 0.10 x10*3/uL   Comprehensive metabolic panel   Result Value Ref Range    Glucose 83 74 - 99 mg/dL    Sodium 141 136 - 145 mmol/L    Potassium 4.2 3.5 - 5.3 mmol/L    Chloride 108 (H) 98 - 107 mmol/L    Bicarbonate 26 21 - 32 mmol/L    Anion Gap 11 10 - 20 mmol/L    Urea Nitrogen 18 6 - 23 mg/dL    Creatinine 0.81 0.50 - 1.05 mg/dL    eGFR 74 >60 mL/min/1.73m*2    Calcium 8.7 8.6 - 10.3 mg/dL    Albumin 3.7 3.4 - 5.0 g/dL    Alkaline Phosphatase 75 33 - 136 U/L    Total Protein 6.8 6.4 - " 8.2 g/dL    AST 17 9 - 39 U/L    Bilirubin, Total 0.4 0.0 - 1.2 mg/dL    ALT 9 7 - 45 U/L   Magnesium   Result Value Ref Range    Magnesium 1.97 1.60 - 2.40 mg/dL   Lactate   Result Value Ref Range    Lactate 2.5 (H) 0.4 - 2.0 mmol/L   ECG 12 lead   Result Value Ref Range    Ventricular Rate 68 BPM    QRS Duration 64 ms    QT Interval 398 ms    QTC Calculation(Bazett) 423 ms    R Axis 9 degrees    T Axis 21 degrees    QRS Count 11 beats    Q Onset 222 ms    P Onset 120 ms    P Offset 153 ms    T Offset 421 ms    QTC Fredericia 415 ms       ECG 12 lead    Result Date: 2/18/2025  Accelerated Junctional rhythm Abnormal ECG When compared with ECG of 22-JUL-2024 11:47, Junctional rhythm has replaced Sinus rhythm        Assessment/Plan   Assessment & Plan  Seizure (Multi)    Elizabeth Garcia is a 79 y.o. female presenting with Seizure like activity. Patient herself is only alert to self. She does have significant dementia. She is unable to provide any information as to why she is here. Per triage note there is concern she had 30 seconds of seizure like activity at her nursing facility.   She was seen by neurology here 5/3/24 for seizure like activity but EEG was negative, just showed moderate to severe encephalopathy. She was not recommended to start AED at that time. She also was admitted to [facility] 10/2024 for seizure like activity as well. Per neuro note at that time it seems that provider was told there was no witnessed seizure like activity. EEG was also negative at that time as well.       PMHX: dementia, seizure like activity      Seizure like activity  -EEG  -neuro consulted, appreciate recs  -seizure precautions    Dementia  -supportive care    DVT prophylaxis:  Lovenox, SCD    DC plan:  DC home when medically stable    Labs/Testing reviewed    Interdisciplinary team rounding completed with hospitalist, nurse, TCC    NP discussed plan and lab/testing results with Dr. Mills      0845 Witnessed seizure while in the ED.  Ativan given. MRI w/wo. Loading dose keppra ordered     1420 Spoke with Dr. Walker- will get repeat EKG. If no EKG abnormalities, would favor vimpat 100 mg BID rather than keppra     I spent 45 minutes in the professional and overall care of this patient.      Elaina Wallace, APRN-CNP

## 2025-02-18 NOTE — PROGRESS NOTES
02/18/25 1255   [facility] Disability Status   Are you deaf or do you have serious difficulty hearing? N   Are you blind or do you have serious difficulty seeing, even when wearing glasses? N   Because of a physical, mental, or emotional condition, do you have serious difficulty concentrating, remembering, or making decisions? (5 years old or older) Y   Do you have serious difficulty walking or climbing stairs? Y   Do you have serious difficulty dressing or bathing? Y   Because of a physical, mental, or emotional condition, do you have serious difficulty doing errands alone such as visiting the doctor? Y

## 2025-02-18 NOTE — CARE PLAN
The patient's goals for the shift include      The clinical goals for the shift include Pt will remain free from falls with seizure monitoring precautions in place at all times

## 2025-02-18 NOTE — PROGRESS NOTES
Transitional Care Coordination Progress Note:  Plan per Medical/Surgical team: treatment of seizure with neuro consult, EEG pending   Status: Observation   Payor source: Erin Hopkinsregor care coordinator Christal Laura [phone], fax #[phone]    [facility]  [phone]  Potential Barriers: dementia  ADOD: 2/19/2025  JOSE Luo RN, BSN Transitional Care Coordinator ED# [phone]      02/18/25 1250   Discharge Planning   Living Arrangements Alone   Support Systems Children   Assistance Needed neuro consult, EEG pending, seizure precautions   Type of Residence Assisted living   Do you have animals or pets at home? No   [facility]  [phone]   Home or Post Acute Services Post acute facilities (Rehab/SNF/etc)   Type of Post Acute Facility Services Assisted living   Expected Discharge Disposition Home   Does the patient need discharge transport arranged? Yes   RoundTrip coordination needed? Yes   Has discharge transport been arranged? No   Financial Resource Strain   How hard is it for you to pay for the very basics like food, housing, medical care, and heating? Pt Unable   Housing Stability   In the last 12 months, was there a time when you were not able to pay the mortgage or rent on time? Pt Unable   In the past 12 months, how many times have you moved where you were living? 1   At any time in the past 12 months, were you homeless or living in a shelter (including now)? Pt Unable   Transportation Needs   In the past 12 months, has lack of transportation kept you from medical appointments or from getting medications? Pt Unable   In the past 12 months, has lack of transportation kept you from meetings, work, or from getting things needed for daily living? Pt Unable   Stroke Family Assessment   Stroke Family Assessment Needed No   Intensity of Service   Intensity of  Service 0-30 min

## 2025-02-18 NOTE — ED PROVIDER NOTES
HPI   Chief Complaint   Patient presents with    Seizures       HPI  Patient is a 79-year-old female with a past medical history significant for dementia, UTIs and reported seizures 3 months ago at [facility] who presented for possible seizure.  No real history is provided on arrival other than patient had a 30 second witnessed seizure.  Patient has dementia and cannot provide any additional information.  She thinks she is in her room.  Patient was not sent with paperwork.      PMHx: Per EMR seizure-like activity, Alzheimer's dementia, anemia  PSHx: None per EMR  FamilyHx: None per EMR  SocialHx: Denies  Allergies: NKDA  Medications: See Medication Reconciliation     ROS  As above otherwise denies    Physical Exam    GENERAL: Awake and Alert, No Acute Distress  HEENT: AT/NC, PERRL, EOMI, Normal Oropharynx, No Signs of Dehydration  NECK: Normal Inspection, No JVD  CARDIOVASCULAR: RRR, No M/R/G  RESPIRATORY: CTA Bilaterally, No Wheezes, Rales or Rhonchi, Chest Wall Non-tender  ABDOMEN: Soft, non-tender abdomen, Normal Bowel Sounds, No Distention  BACK: No CVA Tenderness  SKIN: Normal Color, Warm, Dry, No Rashes   EXTREMITIES: Non-Tender, Full ROM, No Pedal Edema  NEURO: A&O x person, Normal Motor and Sensation, Normal Mood and Affect    Nursing Assessment and Vitals Reviewed    EKG showed an accelerated possibly junctional rhythm with a tremulous baseline.  No ischemic ST changes.  No axis deviation.    Medical Decision  Patient is a 79-year-old female with a past medical history significant for dementia, UTIs and reported seizures 3 months ago at [facility] who presented for possible seizure.  No real history is provided on arrival other than patient had a 30 second witnessed seizure.  Patient has dementia and cannot provide any additional information.  She thinks she is in her room.    On evaluation she is well-appearing and in no acute distress.  She is alert and oriented only to person.  She cannot  provide additional history.  She does not appear to have any signs of injury and per report did not hit her head.  Will perform a workup due to concern for seizure versus episode of altered mental status although presentation is unclear.    Review of records show that patient was hospitalized at outside hospital in October.  At that time she had a seizure and did not hit her head.  At that time she was also alert and oriented x 1.  During that hospitalization she had an EEG that showed no focal activity or epileptiform activity.  She was cleared by neurology to follow-up as an outpatient.    Workup for patient thus far included labs that revealed a slightly elevated lactate of 2.5 and she started on gentle IV hydration.  No significant leukocytosis or anemia.  Patient has yet to be able to give urine sample but will admit for observation.    While awaiting admission patient had a generalized tonic-clonic seizure.  She was given Ativan and her airway was supported with an NP and bagging at bedside until she was able to tolerate on maintain her own airway.  She was loaded with Keppra and admitting team was advised of change in her status.  She has been upgraded to inpatient.  She is currently in stable condition postictal.              Patient History   Past Medical History:   Diagnosis Date    Dementia (Multi)     Personal history of other specified conditions 03/18/2015    History of chest pain     No past surgical history on file.  No family history on file.  Social History     Tobacco Use    Smoking status: Unknown     Passive exposure: Never    Smokeless tobacco: Not on file   Vaping Use    Vaping status: Unknown   Substance Use Topics    Alcohol use: Not on file    Drug use: Not on file       Physical Exam   ED Triage Vitals [02/18/25 0713]   Temperature Heart Rate Respirations BP   36.9 °C (98.4 °F) 63 18 119/67      Pulse Ox Temp Source Heart Rate Source Patient Position   100 % Temporal Monitor Lying      BP  Location FiO2 (%)     Right arm --       Physical Exam      ED Course & MDM   Diagnoses as of 02/18/25 1151   Seizure (Multi)   Dementia, unspecified dementia severity, unspecified dementia type, unspecified whether behavioral, psychotic, or mood disturbance or anxiety (Multi)                 No data recorded     [place] Coma Scale Score: 14 (02/18/25 0826 : Sujey Haro RN)                           Medical Decision Making      Procedure  Procedures     Krista Dc MD  02/18/25 1151       Krista Dc MD  02/18/25 1314

## 2025-02-18 NOTE — HOSPITAL COURSE
Seizure like activity.   She was seen by neurology here 5/3/24 for seizure like activity but EEG was negative, just showed moderate to severe encephalopathy. She was not recommended to start AED at that time. She also was admitted to [facility] 10/2024 for seizure like activity as well. Per neuro note at that time it seems that provider was told there was no witnessed seizure like activity. EEG was also negative at that time as well.       PMHX: dementia, seizure like activity

## 2025-02-18 NOTE — PROGRESS NOTES
Pharmacy Medication History     Source of Information: MEDICATION LIST FROM FACILITY    Additional concerns with the patient's PTA list.     The following updates were made to the Prior to Admission medication list:     Medications ADDED:   OFELIA-DENNIS VANISH 2.5 % GEL  ONDANSETRON ODT 4 MG  LOPERAMIDE 2 MG  Medications CHANGED:  N/A  Medications REMOVED:   METHYL SALICYLATE/MENTHOL ( ICY HOT)  Medications NOT TAKING:   N/A    Allergy reviewed : Yes    Meds 2 Beds : No    Outpatient pharmacy confirmed and updated in chart : Yes    Pharmacy name: [facility]    The list below reflectives the updated PTA list. Please review each medication in order reconciliation for additional clarification and justification.    Prior to Admission Medications   Prescriptions Last Dose Informant     cetirizine (ZyrTEC) 10 mg tablet       Sig: Take 1 tablet (10 mg) by mouth once daily as needed for allergies.   emollient combination no.110 (Eucerin Intensive Repair) lotion       Sig: Apply 1 Application topically once daily. To face   guaiFENesin (Robitussin) 100 mg/5 mL syrup       Sig: Take 10 mL (200 mg) by mouth every 4 hours if needed for cough.   loperamide (Imodium A-D) 2 mg tablet       Sig: Take 1 tablet (2 mg) by mouth 4 times a day as needed for diarrhea.   menthol (BenGay Vanishing Scent) 2.5 % gel       Sig: Apply topically. APPLY TOPICALLY TO LOWER BACK 3 TIMES DAILY AS NEEDED                ondansetron ODT (Zofran-ODT) 4 mg disintegrating tablet       Sig: Dissolve 1 tablet (4 mg) in the mouth 3 times a day as needed for nausea or vomiting.      Facility-Administered Medications: None       The list below reflectives the updated allergy list. Please review each documented allergy for additional clarification and justification.    No Known Allergies       02/18/25 at 2:52 PM - Romy Vang

## 2025-02-18 NOTE — ED TRIAGE NOTES
Pt BIBA from [facility] after a 30 second witnessed seizure. Nursing home was unable to provide a face sheet or any information on this patient. The pt is post ictal upon arrival. Unsure what pt baseline is or if pt has hx of seizures in the past.

## 2025-02-19 LAB
ANION GAP SERPL CALC-SCNC: 11 MMOL/L (ref 10–20)
ATRIAL RATE: 82 BPM
BASOPHILS # BLD AUTO: 0.01 X10*3/UL (ref 0–0.1)
BASOPHILS NFR BLD AUTO: 0.2 %
BUN SERPL-MCNC: 13 MG/DL (ref 6–23)
CALCIUM SERPL-MCNC: 8.3 MG/DL (ref 8.6–10.3)
CHLORIDE SERPL-SCNC: 107 MMOL/L (ref 98–107)
CO2 SERPL-SCNC: 25 MMOL/L (ref 21–32)
CREAT SERPL-MCNC: 0.76 MG/DL (ref 0.5–1.05)
EGFRCR SERPLBLD CKD-EPI 2021: 80 ML/MIN/1.73M*2
EOSINOPHIL # BLD AUTO: 0.04 X10*3/UL (ref 0–0.4)
EOSINOPHIL NFR BLD AUTO: 0.8 %
ERYTHROCYTE [DISTWIDTH] IN BLOOD BY AUTOMATED COUNT: 14.8 % (ref 11.5–14.5)
GLUCOSE SERPL-MCNC: 88 MG/DL (ref 74–99)
HCT VFR BLD AUTO: 37.3 % (ref 36–46)
HGB BLD-MCNC: 12 G/DL (ref 12–16)
IMM GRANULOCYTES # BLD AUTO: 0.02 X10*3/UL (ref 0–0.5)
IMM GRANULOCYTES NFR BLD AUTO: 0.4 % (ref 0–0.9)
LYMPHOCYTES # BLD AUTO: 1.01 X10*3/UL (ref 0.8–3)
LYMPHOCYTES NFR BLD AUTO: 19.8 %
MCH RBC QN AUTO: 26.6 PG (ref 26–34)
MCHC RBC AUTO-ENTMCNC: 32.2 G/DL (ref 32–36)
MCV RBC AUTO: 83 FL (ref 80–100)
MONOCYTES # BLD AUTO: 0.32 X10*3/UL (ref 0.05–0.8)
MONOCYTES NFR BLD AUTO: 6.3 %
NEUTROPHILS # BLD AUTO: 3.69 X10*3/UL (ref 1.6–5.5)
NEUTROPHILS NFR BLD AUTO: 72.5 %
NRBC BLD-RTO: 0 /100 WBCS (ref 0–0)
P AXIS: 33 DEGREES
P OFFSET: 165 MS
P ONSET: 111 MS
PLATELET # BLD AUTO: 239 X10*3/UL (ref 150–450)
POTASSIUM SERPL-SCNC: 3.6 MMOL/L (ref 3.5–5.3)
PR INTERVAL: 210 MS
Q ONSET: 216 MS
QRS COUNT: 14 BEATS
QRS DURATION: 76 MS
QT INTERVAL: 388 MS
QTC CALCULATION(BAZETT): 453 MS
QTC FREDERICIA: 430 MS
R AXIS: 22 DEGREES
RBC # BLD AUTO: 4.51 X10*6/UL (ref 4–5.2)
SODIUM SERPL-SCNC: 139 MMOL/L (ref 136–145)
T AXIS: 63 DEGREES
T OFFSET: 410 MS
VENTRICULAR RATE: 82 BPM
WBC # BLD AUTO: 5.1 X10*3/UL (ref 4.4–11.3)

## 2025-02-19 PROCEDURE — 2500000004 HC RX 250 GENERAL PHARMACY W/ HCPCS (ALT 636 FOR OP/ED): Performed by: NURSE PRACTITIONER

## 2025-02-19 PROCEDURE — 85025 COMPLETE CBC W/AUTO DIFF WBC: CPT | Performed by: NURSE PRACTITIONER

## 2025-02-19 PROCEDURE — 2500000001 HC RX 250 WO HCPCS SELF ADMINISTERED DRUGS (ALT 637 FOR MEDICARE OP): Performed by: NURSE PRACTITIONER

## 2025-02-19 PROCEDURE — 1100000001 HC PRIVATE ROOM DAILY

## 2025-02-19 PROCEDURE — 80048 BASIC METABOLIC PNL TOTAL CA: CPT | Performed by: NURSE PRACTITIONER

## 2025-02-19 PROCEDURE — 36415 COLL VENOUS BLD VENIPUNCTURE: CPT | Performed by: NURSE PRACTITIONER

## 2025-02-19 RX ADMIN — LACOSAMIDE 100 MG: 50 TABLET, FILM COATED ORAL at 20:26

## 2025-02-19 RX ADMIN — DOCUSATE SODIUM 100 MG: 100 CAPSULE, LIQUID FILLED ORAL at 09:55

## 2025-02-19 RX ADMIN — PANTOPRAZOLE SODIUM 40 MG: 40 TABLET, DELAYED RELEASE ORAL at 05:53

## 2025-02-19 RX ADMIN — ENOXAPARIN SODIUM 40 MG: 40 INJECTION SUBCUTANEOUS at 12:47

## 2025-02-19 RX ADMIN — LACOSAMIDE 100 MG: 50 TABLET, FILM COATED ORAL at 09:55

## 2025-02-19 RX ADMIN — DOCUSATE SODIUM 100 MG: 100 CAPSULE, LIQUID FILLED ORAL at 20:26

## 2025-02-19 ASSESSMENT — COGNITIVE AND FUNCTIONAL STATUS - GENERAL
PERSONAL GROOMING: TOTAL
HELP NEEDED FOR BATHING: TOTAL
DRESSING REGULAR UPPER BODY CLOTHING: TOTAL
DRESSING REGULAR LOWER BODY CLOTHING: TOTAL
DAILY ACTIVITIY SCORE: 6
TURNING FROM BACK TO SIDE WHILE IN FLAT BAD: TOTAL
MOVING TO AND FROM BED TO CHAIR: TOTAL
MOVING FROM LYING ON BACK TO SITTING ON SIDE OF FLAT BED WITH BEDRAILS: TOTAL
MOBILITY SCORE: 6
EATING MEALS: TOTAL
TOILETING: TOTAL
STANDING UP FROM CHAIR USING ARMS: TOTAL
WALKING IN HOSPITAL ROOM: TOTAL
CLIMB 3 TO 5 STEPS WITH RAILING: TOTAL

## 2025-02-19 ASSESSMENT — PAIN - FUNCTIONAL ASSESSMENT: PAIN_FUNCTIONAL_ASSESSMENT: 0-10

## 2025-02-19 ASSESSMENT — PAIN SCALES - GENERAL
PAINLEVEL_OUTOF10: 0 - NO PAIN

## 2025-02-19 ASSESSMENT — PAIN SCALES - WONG BAKER: WONGBAKER_NUMERICALRESPONSE: NO HURT

## 2025-02-19 NOTE — CARE PLAN
The patient's goals for the shift include      The clinical goals for the shift include Patient will be placed on seizure precautions and be free from injury.      Problem: Pain - Adult  Goal: Verbalizes/displays adequate comfort level or baseline comfort level  Outcome: Progressing     Problem: Safety - Adult  Goal: Free from fall injury  Outcome: Progressing     Problem: Fall/Injury  Goal: Not fall by end of shift  Outcome: Progressing     Problem: Skin  Goal: Promote skin healing  Outcome: Progressing  Flowsheets (Taken 2/19/2025 0007)  Promote skin healing:   Turn/reposition every 2 hours/use positioning/transfer devices   Protective dressings over bony prominences

## 2025-02-19 NOTE — PROGRESS NOTES
Elizabeth Garcia is a 79 y.o. female on day 1 of admission presenting with Seizure (Multi).      Subjective   Pt care transferred to our service  Pt unable to offer history  She is awake but confused  Holding purwick in her hand      No further seizure per nursing     Objective     Last Recorded Vitals  /66 (BP Location: Left arm, Patient Position: Lying)   Pulse 83   Temp 37.2 °C (99 °F) (Temporal)   Resp 16   Wt 69.4 kg (153 lb)   SpO2 99%   Intake/Output last 3 Shifts:    Intake/Output Summary (Last 24 hours) at 2/19/2025 1338  Last data filed at 2/19/2025 3961  Gross per 24 hour   Intake --   Output 2 ml   Net -2 ml       Admission Weight  Weight: 69.4 kg (153 lb) (02/18/25 0717)    Daily Weight  02/18/25 : 69.4 kg (153 lb)    Image Results  ECG 12 lead  Sinus rhythm with 1st degree AV block  Otherwise normal ECG  When compared with ECG of 18-FEB-2025 07:47, (unconfirmed)  Sinus rhythm has replaced Junctional rhythm  ST elevation now present in Inferior leads      Physical Exam    Constitutional:       Appearance: Normal appearance.   Cardiovascular:      Rate and Rhythm: Normal rate and regular rhythm.      Pulses: Normal pulses.      Heart sounds: Normal heart sounds. No murmur heard.     No gallop.   Pulmonary:      Effort: Pulmonary effort is normal. No respiratory distress.      Breath sounds: Normal breath sounds. No wheezing or rhonchi.   Abdominal:      General: Abdomen is flat. Bowel sounds are normal. There is no distension.      Palpations: Abdomen is soft.      Tenderness: There is no abdominal tenderness. There is no guarding.   Skin:     General: Skin is warm.      Capillary Refill: Capillary refill takes less than 2 seconds.   Neurological:      Mental Status: She is alert. She is disoriented.   Psychiatric:         Cognition and Memory: Cognition is impaired. Memory is impaired.   Relevant Results             No current facility-administered medications on file prior to encounter.      Current Outpatient Medications on File Prior to Encounter   Medication Sig Dispense Refill    cetirizine (ZyrTEC) 10 mg tablet Take 1 tablet (10 mg) by mouth once daily as needed for allergies.      emollient combination no.110 (Eucerin Intensive Repair) lotion Apply 1 Application topically once daily. To face      guaiFENesin (Robitussin) 100 mg/5 mL syrup Take 10 mL (200 mg) by mouth every 4 hours if needed for cough.      loperamide (Imodium A-D) 2 mg tablet Take 1 tablet (2 mg) by mouth 4 times a day as needed for diarrhea.      menthol (BenGay Vanishing Scent) 2.5 % gel Apply topically. APPLY TOPICALLY TO LOWER BACK 3 TIMES DAILY AS NEEDED      ondansetron ODT (Zofran-ODT) 4 mg disintegrating tablet Dissolve 1 tablet (4 mg) in the mouth 3 times a day as needed for nausea or vomiting.      [DISCONTINUED] methyl salicylate/menthol (ICY HOT TOP) Apply 1 Application topically 3 times a day as needed (for lower back pain). (Patient not taking: Reported on 2/18/2025)        Results for orders placed or performed during the hospital encounter of 02/18/25 (from the past 24 hours)   ECG 12 lead   Result Value Ref Range    Ventricular Rate 82 BPM    Atrial Rate 82 BPM    IL Interval 210 ms    QRS Duration 76 ms    QT Interval 388 ms    QTC Calculation(Bazett) 453 ms    P Axis 33 degrees    R Axis 22 degrees    T Axis 63 degrees    QRS Count 14 beats    Q Onset 216 ms    P Onset 111 ms    P Offset 165 ms    T Offset 410 ms    QTC Fredericia 430 ms   Basic metabolic panel   Result Value Ref Range    Glucose 88 74 - 99 mg/dL    Sodium 139 136 - 145 mmol/L    Potassium 3.6 3.5 - 5.3 mmol/L    Chloride 107 98 - 107 mmol/L    Bicarbonate 25 21 - 32 mmol/L    Anion Gap 11 10 - 20 mmol/L    Urea Nitrogen 13 6 - 23 mg/dL    Creatinine 0.76 0.50 - 1.05 mg/dL    eGFR 80 >60 mL/min/1.73m*2    Calcium 8.3 (L) 8.6 - 10.3 mg/dL   CBC and Auto Differential   Result Value Ref Range    WBC 5.1 4.4 - 11.3 x10*3/uL    nRBC 0.0 0.0 - 0.0  /100 WBCs    RBC 4.51 4.00 - 5.20 x10*6/uL    Hemoglobin 12.0 12.0 - 16.0 g/dL    Hematocrit 37.3 36.0 - 46.0 %    MCV 83 80 - 100 fL    MCH 26.6 26.0 - 34.0 pg    MCHC 32.2 32.0 - 36.0 g/dL    RDW 14.8 (H) 11.5 - 14.5 %    Platelets 239 150 - 450 x10*3/uL    Neutrophils % 72.5 40.0 - 80.0 %    Immature Granulocytes %, Automated 0.4 0.0 - 0.9 %    Lymphocytes % 19.8 13.0 - 44.0 %    Monocytes % 6.3 2.0 - 10.0 %    Eosinophils % 0.8 0.0 - 6.0 %    Basophils % 0.2 0.0 - 2.0 %    Neutrophils Absolute 3.69 1.60 - 5.50 x10*3/uL    Immature Granulocytes Absolute, Automated 0.02 0.00 - 0.50 x10*3/uL    Lymphocytes Absolute 1.01 0.80 - 3.00 x10*3/uL    Monocytes Absolute 0.32 0.05 - 0.80 x10*3/uL    Eosinophils Absolute 0.04 0.00 - 0.40 x10*3/uL    Basophils Absolute 0.01 0.00 - 0.10 x10*3/uL           Assessment/Plan         Assessment & Plan  Seizure (Multi)      Seizure like activity  - neuro consulted, appreciate recs >> c/w current regimen   -seizure precautions    If no further events and mentation improves / handles secretions consider removing nasal trumpet later      Dementia  -supportive care     DVT prophylaxis:  Lovenox, SCD     DC plan:  On going     -Continue current treatment as ordered. Will make adjustments as necessary.    VTE Prophylaxis  -See Orders    Plan of care discussed with: Provider, RN, Patient.              Patient case and plan of care discussed with Dr. RAYA Jeffesr.    VIOLET Pierce - CNP  -In collaboration with Dr. RAYA Jeffers    [facility]  Office: [phone]  Fax: [phone]  I have reviewed the above note obtained and documented by the NP/PA and I personally participated in the key components. I have discussed the case and management of the patient's care. Changes made to the note, and all key components of history and physical/progress note done by me.  keiko nursing  Seen this evening   Son is y bedside  Pt lives in [place]  She was sent to hospital  for seizure like activity however then had seizure in the ER and started on kepra  Seen neuro   Prior 2 admissions similar presentations but her EEG was negative   This admission eeg encephalopathy   MRI no stroke, does have volume loss  Started on vimpat  Cont with current   Ot and pt   Some somnolence could be releated to meds   Detailed dw pts son by bedside  Gianni Jeffers MD

## 2025-02-19 NOTE — CONSULTS
Consults    History Of Present Illness  Elizabeth Garcia, a 79-year-old woman, found herself in the emergency room once again. Her frail frame and confused expression told a story of a long meade with dementia. Today, she was only aware of her own presence, unable to grasp the reason for her visit.    The triage nurse's notes indicated that Elizabeth had experienced a brief episode of seizure-like activity, lasting about 30 seconds, at her nursing facility. This wasn't the first time Elizabeth had faced such an episode. On May 3, 2024, she had been evaluated by neurology for similar symptoms. The EEG performed then had shown moderate to severe encephalopathy, but no seizures. Consequently, no anti-epileptic drugs were prescribed.    In October 2024, Elizabeth had another episode that led to her admission to the [facility] ([facility] However, the neurologist's notes from that time revealed that no seizure activity had been witnessed, and the EEG results were negative once again.    Now, as Elizabeth sat in the emergency room, the medical team pondered over her recurring episodes, trying to piece together the puzzle of her health.  Team was subsequently consulted for the third episode of seizure.  At this time given 3 such episodes of seizure and some event witnessed recommended to start her on antiseizure medication.  She received levetiracetam in ED but given her age, dementia and frail state if EEG was unremarkable then recommended lacosamide.  When I saw her she was just running out of the MRI scan and subsequently had an opportunity to glanced through imaging.  I did not see any obvious deficits or any stroke except robust global atrophy.  However, an MRI report is pending.  She remained stable.  Past Medical History  Past Medical History:   Diagnosis Date    Dementia (Multi)     Personal history of other specified conditions 03/18/2015    History of chest pain     Surgical History  No past surgical history on  "file.  Social History  Social History     Tobacco Use    Smoking status: Unknown     Passive exposure: Never   Vaping Use    Vaping status: Unknown     Allergies  Patient has no known allergies.  Medications Prior to Admission   Medication Sig Dispense Refill Last Dose/Taking    cetirizine (ZyrTEC) 10 mg tablet Take 1 tablet (10 mg) by mouth once daily as needed for allergies.       emollient combination no.110 (Eucerin Intensive Repair) lotion Apply 1 Application topically once daily. To face       guaiFENesin (Robitussin) 100 mg/5 mL syrup Take 10 mL (200 mg) by mouth every 4 hours if needed for cough.       loperamide (Imodium A-D) 2 mg tablet Take 1 tablet (2 mg) by mouth 4 times a day as needed for diarrhea.       menthol (BenGay Vanishing Scent) 2.5 % gel Apply topically. APPLY TOPICALLY TO LOWER BACK 3 TIMES DAILY AS NEEDED       ondansetron ODT (Zofran-ODT) 4 mg disintegrating tablet Dissolve 1 tablet (4 mg) in the mouth 3 times a day as needed for nausea or vomiting.          Review of Systems as noted in HPI  Neurological Exam Elizabeth was alert but could only follow minimal commands, managing to tell me her name. Her face appeared symmetric, and her extraocular movements were full and normal. She exhibited normal saccades and vestibulo-ocular reflex (VOR). When subjected to noxious stimuli, she moved all four extremities both spontaneously and in response. No abnormal involuntary movements were observed.    Last Recorded Vitals  Blood pressure 118/71, pulse 92, temperature 36.4 °C (97.5 °F), temperature source Temporal, resp. rate 20, height 1.575 m (5' 2\"), weight 69.4 kg (153 lb), SpO2 97%.    Relevant Results      Hubert Coma Scale  Best Eye Response: Spontaneous  Best Verbal Response: Confused  Best Motor Response: Follows commands  [place] Coma Scale Score: 14    I have personally reviewed the following imaging results MR brain w and wo IV contrast    Result Date: 2/18/2025  Interpreted By:  Kailyn, " Mervin, STUDY: MR BRAIN W AND WO IV CONTRAST;  2/18/2025 7:09 pm   INDICATION: Signs/Symptoms: seizure.     COMPARISON: CT head 07/22/2024.   ACCESSION NUMBER(S): [id number]   ORDERING CLINICIAN: FINESSE CALDWELL   TECHNIQUE: Axial T2, FLAIR, DWI, gradient echo T2 and sagittal and coronal T1 weighted images of brain were acquired.  14 cc Dotarem administered.   FINDINGS: No abnormally restricted diffusion.     No acute intracranial hemorrhage.     No extra-axial fluid collection. No intracranial mass. Major vascular flow voids intact.   Mild white matter FLAIR signal increase most commonly seen with early chronic small vessel ischemic change.     No abnormal brain parenchymal enhancement. There is very thin diffuse dural enhancement throughout the intracranial compartment which is nonspecific but could be related to recent seizure activity.   Mild-to-moderate brain parenchymal volume loss with somewhat of a bilateral perisylvian/temporal predominance.   No significant paranasal sinus/ethmoid mucosal inflammatory changes. No significant nonspecific mastoid fluid. Orbital contents unremarkable. No destructive osseous lesions identified.       1. No evidence of acute infarct, intracranial mass effect or midline shift. 2. Mild white matter FLAIR signal increase most commonly seen with early chronic small vessel ischemic change. 3. Mild-to-moderate brain parenchymal volume loss with somewhat of a bilateral perisylvian/temporal predominance. 4. Very thin diffuse dural enhancement throughout the intracranial compartment which is nonspecific but could be related to recent seizure activity.   MACRO: None   Signed by: Mervin Avina 2/18/2025 7:25 PM Dictation workstation:   [id number]    EEG    IMPRESSION Impression This routine EEG is indicative of a moderate to severe diffuse encephalopathy. No epileptiform activity or lateralizing signs seen. A full report will be scanned into the patient's chart at a later time. This  report has been interpreted and electronically signed by    ECG 12 lead    Result Date: 2/18/2025  Accelerated Junctional rhythm Abnormal ECG When compared with ECG of 22-JUL-2024 11:47, Junctional rhythm has replaced Sinus rhythm  .      Assessment/Plan   Verenice, a 79-year-old woman, is here for the evaluation of her third seizure episode. Due to the frequency of her spells and hospital admissions, it is recommended to start her on seizure medication empirically. Considering her existing dementia and the potential cognitive side effects of Keppra, and given that her EKG shows no conduction block, it is advised to start her on lacosamide 100 mg twice a day. Although her EEG and MRI results are unremarkable, this treatment is based on the pattern of her frequent admissions. An outpatient follow-up with neurology is recommended.  I spent 60 minutes in the professional and overall care of this patient.      Aasef G Shaikh, MD PhD

## 2025-02-20 LAB
ANION GAP SERPL CALC-SCNC: 12 MMOL/L (ref 10–20)
BASOPHILS # BLD AUTO: 0.02 X10*3/UL (ref 0–0.1)
BASOPHILS NFR BLD AUTO: 0.4 %
BUN SERPL-MCNC: 17 MG/DL (ref 6–23)
CALCIUM SERPL-MCNC: 8.3 MG/DL (ref 8.6–10.3)
CHLORIDE SERPL-SCNC: 108 MMOL/L (ref 98–107)
CO2 SERPL-SCNC: 23 MMOL/L (ref 21–32)
CREAT SERPL-MCNC: 0.88 MG/DL (ref 0.5–1.05)
EGFRCR SERPLBLD CKD-EPI 2021: 67 ML/MIN/1.73M*2
EOSINOPHIL # BLD AUTO: 0.08 X10*3/UL (ref 0–0.4)
EOSINOPHIL NFR BLD AUTO: 1.5 %
ERYTHROCYTE [DISTWIDTH] IN BLOOD BY AUTOMATED COUNT: 15.1 % (ref 11.5–14.5)
GLUCOSE SERPL-MCNC: 90 MG/DL (ref 74–99)
HCT VFR BLD AUTO: 40.2 % (ref 36–46)
HGB BLD-MCNC: 12.1 G/DL (ref 12–16)
IMM GRANULOCYTES # BLD AUTO: 0.02 X10*3/UL (ref 0–0.5)
IMM GRANULOCYTES NFR BLD AUTO: 0.4 % (ref 0–0.9)
LYMPHOCYTES # BLD AUTO: 1.72 X10*3/UL (ref 0.8–3)
LYMPHOCYTES NFR BLD AUTO: 32.3 %
MCH RBC QN AUTO: 26 PG (ref 26–34)
MCHC RBC AUTO-ENTMCNC: 30.1 G/DL (ref 32–36)
MCV RBC AUTO: 86 FL (ref 80–100)
MONOCYTES # BLD AUTO: 0.46 X10*3/UL (ref 0.05–0.8)
MONOCYTES NFR BLD AUTO: 8.6 %
NEUTROPHILS # BLD AUTO: 3.02 X10*3/UL (ref 1.6–5.5)
NEUTROPHILS NFR BLD AUTO: 56.8 %
NRBC BLD-RTO: 0 /100 WBCS (ref 0–0)
PLATELET # BLD AUTO: 214 X10*3/UL (ref 150–450)
POTASSIUM SERPL-SCNC: 3.7 MMOL/L (ref 3.5–5.3)
RBC # BLD AUTO: 4.66 X10*6/UL (ref 4–5.2)
SODIUM SERPL-SCNC: 139 MMOL/L (ref 136–145)
WBC # BLD AUTO: 5.3 X10*3/UL (ref 4.4–11.3)

## 2025-02-20 PROCEDURE — 1100000001 HC PRIVATE ROOM DAILY

## 2025-02-20 PROCEDURE — 2500000004 HC RX 250 GENERAL PHARMACY W/ HCPCS (ALT 636 FOR OP/ED): Performed by: NURSE PRACTITIONER

## 2025-02-20 PROCEDURE — 36415 COLL VENOUS BLD VENIPUNCTURE: CPT | Performed by: NURSE PRACTITIONER

## 2025-02-20 PROCEDURE — 85025 COMPLETE CBC W/AUTO DIFF WBC: CPT | Performed by: NURSE PRACTITIONER

## 2025-02-20 PROCEDURE — 2500000001 HC RX 250 WO HCPCS SELF ADMINISTERED DRUGS (ALT 637 FOR MEDICARE OP): Performed by: NURSE PRACTITIONER

## 2025-02-20 PROCEDURE — 97165 OT EVAL LOW COMPLEX 30 MIN: CPT | Mod: GO | Performed by: OCCUPATIONAL THERAPIST

## 2025-02-20 PROCEDURE — 80048 BASIC METABOLIC PNL TOTAL CA: CPT | Performed by: NURSE PRACTITIONER

## 2025-02-20 PROCEDURE — 97161 PT EVAL LOW COMPLEX 20 MIN: CPT | Mod: GP

## 2025-02-20 RX ADMIN — LACOSAMIDE 100 MG: 50 TABLET, FILM COATED ORAL at 20:15

## 2025-02-20 RX ADMIN — DOCUSATE SODIUM 100 MG: 100 CAPSULE, LIQUID FILLED ORAL at 08:16

## 2025-02-20 RX ADMIN — PANTOPRAZOLE SODIUM 40 MG: 40 TABLET, DELAYED RELEASE ORAL at 07:11

## 2025-02-20 RX ADMIN — LACOSAMIDE 100 MG: 50 TABLET, FILM COATED ORAL at 08:16

## 2025-02-20 RX ADMIN — DOCUSATE SODIUM 100 MG: 100 CAPSULE, LIQUID FILLED ORAL at 20:15

## 2025-02-20 RX ADMIN — ENOXAPARIN SODIUM 40 MG: 40 INJECTION SUBCUTANEOUS at 12:02

## 2025-02-20 ASSESSMENT — COGNITIVE AND FUNCTIONAL STATUS - GENERAL
DAILY ACTIVITIY SCORE: 17
MOBILITY SCORE: 15
DRESSING REGULAR LOWER BODY CLOTHING: A LOT
HELP NEEDED FOR BATHING: A LITTLE
PERSONAL GROOMING: A LOT
EATING MEALS: A LITTLE
MOVING FROM LYING ON BACK TO SITTING ON SIDE OF FLAT BED WITH BEDRAILS: A LITTLE
MOVING TO AND FROM BED TO CHAIR: TOTAL
PERSONAL GROOMING: A LITTLE
DRESSING REGULAR UPPER BODY CLOTHING: A LOT
HELP NEEDED FOR BATHING: A LOT
TOILETING: A LITTLE
MOBILITY SCORE: 9
MOVING FROM LYING ON BACK TO SITTING ON SIDE OF FLAT BED WITH BEDRAILS: A LITTLE
DRESSING REGULAR UPPER BODY CLOTHING: A LITTLE
CLIMB 3 TO 5 STEPS WITH RAILING: TOTAL
DAILY ACTIVITIY SCORE: 12
MOVING TO AND FROM BED TO CHAIR: A LITTLE
WALKING IN HOSPITAL ROOM: A LITTLE
DRESSING REGULAR LOWER BODY CLOTHING: A LOT
TURNING FROM BACK TO SIDE WHILE IN FLAT BAD: A LOT
TOILETING: A LOT
WALKING IN HOSPITAL ROOM: TOTAL
CLIMB 3 TO 5 STEPS WITH RAILING: TOTAL
EATING MEALS: A LOT
STANDING UP FROM CHAIR USING ARMS: A LOT
TURNING FROM BACK TO SIDE WHILE IN FLAT BAD: A LITTLE
STANDING UP FROM CHAIR USING ARMS: TOTAL

## 2025-02-20 ASSESSMENT — PAIN - FUNCTIONAL ASSESSMENT
PAIN_FUNCTIONAL_ASSESSMENT: 0-10

## 2025-02-20 ASSESSMENT — PAIN SCALES - GENERAL
PAINLEVEL_OUTOF10: 0 - NO PAIN

## 2025-02-20 NOTE — PROGRESS NOTES
Elizabeth Garcia is a 79 y.o. female on day 2 of admission presenting with Seizure (Multi).      Subjective     Pt more awake and alert  Plesaant  Able to answer some simple questions  No ac issues no seizure per nurse      Objective     Last Recorded Vitals  BP 94/58   Pulse 75   Temp 37.1 °C (98.7 °F) (Temporal)   Resp 18   Wt 69.4 kg (153 lb)   SpO2 96%   Intake/Output last 3 Shifts:    Intake/Output Summary (Last 24 hours) at 2/20/2025 1236  Last data filed at 2/19/2025 1954  Gross per 24 hour   Intake --   Output 400 ml   Net -400 ml       Admission Weight  Weight: 69.4 kg (153 lb) (02/18/25 0717)    Daily Weight  02/18/25 : 69.4 kg (153 lb)    Image Results  ECG 12 lead  Sinus rhythm with 1st degree AV block  Otherwise normal ECG  When compared with ECG of 18-FEB-2025 07:47, (unconfirmed)  Sinus rhythm has replaced Junctional rhythm  ST elevation now present in Inferior leads      Physical Exam    Constitutional:       Appearance: Normal appearance.   Cardiovascular:      Rate and Rhythm: Normal rate and regular rhythm.      Pulses: Normal pulses.      Heart sounds: Normal heart sounds. No murmur heard.     No gallop.   Pulmonary:      Effort: Pulmonary effort is normal. No respiratory distress.      Breath sounds: Normal breath sounds. No wheezing or rhonchi.   Abdominal:      General: Abdomen is flat. Bowel sounds are normal. There is no distension.      Palpations: Abdomen is soft.      Tenderness: There is no abdominal tenderness. There is no guarding.   Skin:     General: Skin is warm.      Capillary Refill: Capillary refill takes less than 2 seconds.   Neurological:      Mental Status: She is alert. She is disoriented.    Psychiatric:         Cognition and Memory: Cognition is impaired. Memory is impaired.   Relevant Results             No current facility-administered medications on file prior to encounter.     Current Outpatient Medications on File Prior to Encounter   Medication Sig Dispense Refill     cetirizine (ZyrTEC) 10 mg tablet Take 1 tablet (10 mg) by mouth once daily as needed for allergies.      emollient combination no.110 (Eucerin Intensive Repair) lotion Apply 1 Application topically once daily. To face      guaiFENesin (Robitussin) 100 mg/5 mL syrup Take 10 mL (200 mg) by mouth every 4 hours if needed for cough.      loperamide (Imodium A-D) 2 mg tablet Take 1 tablet (2 mg) by mouth 4 times a day as needed for diarrhea.      menthol (BenGay Vanishing Scent) 2.5 % gel Apply topically. APPLY TOPICALLY TO LOWER BACK 3 TIMES DAILY AS NEEDED      ondansetron ODT (Zofran-ODT) 4 mg disintegrating tablet Dissolve 1 tablet (4 mg) in the mouth 3 times a day as needed for nausea or vomiting.      [DISCONTINUED] methyl salicylate/menthol (ICY HOT TOP) Apply 1 Application topically 3 times a day as needed (for lower back pain). (Patient not taking: Reported on 2/18/2025)        Results for orders placed or performed during the hospital encounter of 02/18/25 (from the past 24 hours)   Basic metabolic panel   Result Value Ref Range    Glucose 90 74 - 99 mg/dL    Sodium 139 136 - 145 mmol/L    Potassium 3.7 3.5 - 5.3 mmol/L    Chloride 108 (H) 98 - 107 mmol/L    Bicarbonate 23 21 - 32 mmol/L    Anion Gap 12 10 - 20 mmol/L    Urea Nitrogen 17 6 - 23 mg/dL    Creatinine 0.88 0.50 - 1.05 mg/dL    eGFR 67 >60 mL/min/1.73m*2    Calcium 8.3 (L) 8.6 - 10.3 mg/dL   CBC and Auto Differential   Result Value Ref Range    WBC 5.3 4.4 - 11.3 x10*3/uL    nRBC 0.0 0.0 - 0.0 /100 WBCs    RBC 4.66 4.00 - 5.20 x10*6/uL    Hemoglobin 12.1 12.0 - 16.0 g/dL    Hematocrit 40.2 36.0 - 46.0 %    MCV 86 80 - 100 fL    MCH 26.0 26.0 - 34.0 pg    MCHC 30.1 (L) 32.0 - 36.0 g/dL    RDW 15.1 (H) 11.5 - 14.5 %    Platelets 214 150 - 450 x10*3/uL    Neutrophils % 56.8 40.0 - 80.0 %    Immature Granulocytes %, Automated 0.4 0.0 - 0.9 %    Lymphocytes % 32.3 13.0 - 44.0 %    Monocytes % 8.6 2.0 - 10.0 %    Eosinophils % 1.5 0.0 - 6.0 %     Basophils % 0.4 0.0 - 2.0 %    Neutrophils Absolute 3.02 1.60 - 5.50 x10*3/uL    Immature Granulocytes Absolute, Automated 0.02 0.00 - 0.50 x10*3/uL    Lymphocytes Absolute 1.72 0.80 - 3.00 x10*3/uL    Monocytes Absolute 0.46 0.05 - 0.80 x10*3/uL    Eosinophils Absolute 0.08 0.00 - 0.40 x10*3/uL    Basophils Absolute 0.02 0.00 - 0.10 x10*3/uL           Assessment/Plan         Assessment & Plan  Seizure (Multi)      Seizure like activity >> none further reported   - neuro consulted, appreciate recs >> c/w current regimen   -seizure precautions          Dementia  -supportive care     DVT prophylaxis:  Lovenox, SCD     DC plan:  On going     -Continue current treatment as ordered. Will make adjustments as necessary.    VTE Prophylaxis  -See Orders    PTOT    Pending neuro clearance  DC planning      Plan of care discussed with: Provider, RN, Patient.              Patient case and plan of care discussed with Dr. RAYA Jeffers.    VIOLET Pierce - CNP  -In collaboration with Dr. RAYA Jeffers    [facility]  Office: [phone]  Fax: [phone]   I have reviewed the above note obtained and documented by the NP/PA and I personally participated in the key components. I have discussed the case and management of the patient's care. Changes made to the note, and all key components of history and physical/progress note done by me.   nursing  Will cont with current   Dc planning for assisted living   Gianni Jeffers MD

## 2025-02-20 NOTE — CARE PLAN
The patient's goals for the shift include unable to assess    The clinical goals for the shift include Pt will remain safe from falls this shift      Problem: Pain - Adult  Goal: Verbalizes/displays adequate comfort level or baseline comfort level  Outcome: Progressing     Problem: Safety - Adult  Goal: Free from fall injury  Outcome: Progressing     Problem: Discharge Planning  Goal: Discharge to home or other facility with appropriate resources  Outcome: Progressing     Problem: Chronic Conditions and Co-morbidities  Goal: Patient's chronic conditions and co-morbidity symptoms are monitored and maintained or improved  Outcome: Progressing     Problem: Nutrition  Goal: Nutrient intake appropriate for maintaining nutritional needs  Outcome: Progressing     Problem: Fall/Injury  Goal: Not fall by end of shift  Outcome: Progressing  Goal: Be free from injury by end of the shift  Outcome: Progressing  Goal: Verbalize understanding of personal risk factors for fall in the hospital  Outcome: Progressing  Goal: Verbalize understanding of risk factor reduction measures to prevent injury from fall in the home  Outcome: Progressing  Goal: Use assistive devices by end of the shift  Outcome: Progressing  Goal: Pace activities to prevent fatigue by end of the shift  Outcome: Progressing     Problem: Skin  Goal: Prevent/manage excess moisture  Outcome: Progressing  Flowsheets (Taken 2/20/2025 1032)  Prevent/manage excess moisture:   Cleanse incontinence/protect with barrier cream   Monitor for/manage infection if present  Goal: Prevent/minimize sheer/friction injuries  Outcome: Progressing  Flowsheets (Taken 2/20/2025 1032)  Prevent/minimize sheer/friction injuries:   Use pull sheet   HOB 30 degrees or less  Goal: Promote/optimize nutrition  Outcome: Progressing  Flowsheets (Taken 2/20/2025 1032)  Promote/optimize nutrition:   Consume > 50% meals/supplements   Assist with feeding

## 2025-02-20 NOTE — PROGRESS NOTES
Physical Therapy    Physical Therapy Evaluation    Patient Name: Elizabeth Garcia  MRN: [id number]  Today's Date: 2/20/2025   Time Calculation  Start Time: 1022  Stop Time: 1035  Time Calculation (min): 13 min  624/624-A    Assessment/Plan   PT Assessment  Barriers to Discharge Home: No anticipated barriers (Anticipate sufficient caregiver assistance available memory care unit.)  Evaluation/Treatment Tolerance: Patient tolerated treatment well  Medical Staff Made Aware: Yes  Barriers to Participation: Ability to acquire knowledge  End of Session Communication: Bedside nurse  Assessment Comment: PT eval completed, and pt is likely close to or at baseline. She is very confused, and is not appropriate for skilled PT interventions. Pt can be d/c'd back to her memory care unit.  End of Session Patient Position: Bed, 3 rail up, Alarm on  IP OR SWING BED PT PLAN  Inpatient or Swing Bed: Inpatient  PT Plan  PT Plan: PT Eval only  PT Eval Only Reason: Does not meet criteria for skilled interventions  PT Frequency: PT eval only  PT Discharge Recommendations: 24 hr supervision due to cognition (return back to memory care unit)  PT Recommended Transfer Status: Assist x1, Assistive device  PT - OK to Discharge: Yes    Subjective     Current Problem:  1. Seizure (Multi)  EEG      2. Dementia, unspecified dementia severity, unspecified dementia type, unspecified whether behavioral, psychotic, or mood disturbance or anxiety (Multi)          Patient Active Problem List   Diagnosis    Urinary tract infection without hematuria, site unspecified    Dementia, unspecified dementia severity, unspecified dementia type, unspecified whether behavioral, psychotic, or mood disturbance or anxiety (Multi)    Seizure (Multi)       General Visit Information:  General  Reason for Referral: Pt admitted from [facility] memory care unit with seizures.  Family/Caregiver Present: No  Co-Treatment: OT  Co-Treatment Reason: to maximize pt safety and mobility  Prior to  Session Communication: Bedside nurse  Patient Position Received: Bed, 4 rail up, Alarm on (seizure pads in place)  Preferred Learning Style: auditory, kinesthetic  General Comment: Pt is pleasant, but very confused and unable to answer questions appropriately. She is a poor historian, and requires lots of repeated cueing to participate.    Home Living:  Home Living  Type of Home: Memory Care unit    Prior Level of Function:  Prior Function Per Pt/Caregiver Report  Prior Function Comments: Pt is a poor historian, and unable to provide pertinent information.    Precautions:  Precautions  Medical Precautions: Fall precautions, Seizure precautions       Objective     Pain:  Pain Assessment  Pain Assessment: 0-10  0-10 (Numeric) Pain Score: 0 - No pain    Cognition:  Cognition  Overall Cognitive Status: Impaired at baseline  Orientation Level: Disoriented to place, Disoriented to time, Disoriented to situation  Following Commands:  (follows about 75% commands with repetition, multimodal cueing and demonstration)  Attention:  (easily distracted)  Memory:  (impaired)  Insight: Severe  Impulsive: Moderately    General Assessments:      Activity Tolerance  Endurance: Endurance does not limit participation in activity        Perception  Inattention/Neglect: Appears intact        Static Sitting Balance  Static Sitting-Balance Support: Feet supported, No upper extremity supported  Static Sitting-Level of Assistance: Close supervision  Dynamic Sitting Balance  Dynamic Sitting-Balance Support: Feet supported, No upper extremity supported  Dynamic Sitting-Level of Assistance: Close supervision  Dynamic Sitting-Balance: Forward lean, Reaching for objects, Trunk control activities  Static Standing Balance  Static Standing-Balance Support: Bilateral upper extremity supported  Static Standing-Level of Assistance: Contact guard  Dynamic Standing Balance  Dynamic Standing-Balance Support: No upper extremity supported  Dynamic  Standing-Level of Assistance: Minimum assistance  Dynamic Standing-Balance:  (pulling up briefs)    Functional Assessments:     Bed Mobility  Bed Mobility: Yes  Bed Mobility 1  Bed Mobility 1: Supine to sitting, Sitting to supine, Scooting  Level of Assistance 1: Close supervision, Minimal verbal cues  Bed Mobility Comments 1: Cues provided.  Transfers  Transfer: Yes  Transfer 1  Technique 1: Sit to stand, Stand to sit  Transfer Device 1: Gait belt  Transfer Level of Assistance 1: Minimum assistance, Minimal verbal cues, Minimal tactile cues, +2  Trials/Comments 1: Cues to weightshift COG forward over toes provided for sit to stand,  Transfers 2  Technique 2: Sit to stand, Stand to sit  Transfer Device 2: Walker, Gait belt  Transfer Level of Assistance 2: Minimum assistance, +2, Moderate verbal cues, Moderate tactile cues  Trials/Comments 2: Pt provided instruction in safe sit<->stand technique to enable them to move in/out of bed/chair safely; pt required moderate tactile and verbal cues for proper hand placements and to scoot to edge of sitting surface to facilitate ease of sit->stand, and to line up to and reach back for sitting surface before sitting. Despite cueing, pt continues to pull up from ww.  Ambulation/Gait Training  Ambulation/Gait Training Performed: Yes  Ambulation/Gait Training 1  Surface 1: Level tile  Device 1:  (B handheld assist)  Gait Support Devices: Gait belt  Assistance 1: Minimum assistance, Minimal verbal cues (x 2 person)  Quality of Gait 1: Forward flexed posture (cues required to maintain safety)  Comments/Distance (ft) 1: 35'  Ambulation/Gait Training 2  Surface 2: Level tile  Device 2: Rolling walker  Gait Support Devices: Gait belt  Assistance 2: Contact guard, Minimal verbal cues  Quality of Gait 2:  (pt managing ww without assist, but still requires cueing to maintain safety while amb around room, no overt LOB)  Comments/Distance (ft) 2: 35'          Extremity/Trunk Assessments:         RLE   RLE :  (observed to be WFL, difficulty following MMT commands due to cognition)  LLE   LLE :  (observed to be WFL, difficulty following MMT commands due to cognition)    Outcome Measures:     [facility] Basic Mobility  Turning from your back to your side while in a flat bed without using bedrails: A little  Moving from lying on your back to sitting on the side of a flat bed without using bedrails: A little  Moving to and from bed to chair (including a wheelchair): A little  Standing up from a chair using your arms (e.g. wheelchair or bedside chair): A lot  To walk in hospital room: A little  Climbing 3-5 steps with railing: Total  Basic Mobility - Total Score: 15        Education Documentation  Mobility Training, taught by Michelle William PT at 2/20/2025 11:43 AM.  Learner: Patient  Readiness: Acceptance  Method: Explanation  Response: No Evidence of Learning    Education Comments  No comments found.

## 2025-02-20 NOTE — PROGRESS NOTES
Occupational Therapy    Evaluation    Patient Name: Elizabeth Garcia  MRN: [id number]  Department: [facility] A 6  Room: [address] [place] [address]  Today's Date: 2/20/2025  Time Calculation  Start Time: 1023  Stop Time: 1036  Time Calculation (min): 13 min        Assessment:  OT Assessment: Pt presents to OT and appears near baseline level of function for ADLs and functional transfers/mobility. Pt demos significant cognitive deficits and resides in a memory care unit. Pt is not appropriate for acute skilled OT services, will D/C from OT caseload.  Prognosis: Poor  Barriers to Discharge Home: No anticipated barriers (Anticipate sufficient caregiver assistance available at memory care unit)  Evaluation/Treatment Tolerance: Patient tolerated treatment well  Medical Staff Made Aware: Yes  End of Session Communication: Bedside nurse  End of Session Patient Position: Bed, 4 rail up, Alarm on (x4 padded rails d/t seizure precautions)  Prognosis: Poor  Barriers to Discharge: None  Evaluation/Treatment Tolerance: Patient tolerated treatment well  Medical Staff Made Aware: Yes  Barriers to Participation: Ability to acquire knowledge, Insight into problems, Comorbidities  Plan:  No Skilled OT: No acute OT goals identified  OT Frequency: OT eval only  OT Discharge Recommendations: No further acute OT, 24 hr supervision due to cognition  Equipment Recommended upon Discharge:  (TBD)  OT Recommended Transfer Status: Assist of 1  OT - OK to Discharge: Yes       Subjective     General:  General  Reason for Referral: Pt is a 80 y/o female admitted from [facility] memory care unit with seizures.  Referred By: Michael (APRN-CNP)  Past Medical History Relevant to Rehab:   Past Medical History:   Diagnosis Date    Dementia (Multi)     Personal history of other specified conditions 03/18/2015    History of chest pain      Family/Caregiver Present: No  Co-Treatment: PT  Co-Treatment Reason: to maximize safety and participation with skilled intervention  Prior to Session  Communication: Bedside nurse  Patient Position Received: Bed, 4 rail up, Alarm on (x4 padded rails d/t seizure precautions)  Preferred Learning Style: auditory, verbal, visual  General Comment: Pt supine in bed upon arrival, pleasant, flat affect throughout, significant confusion. Pt is a poor historian, unable to provided any information re: PLOF and home set up.  Precautions:  Medical Precautions: Fall precautions, Seizure precautions            Pain:  Pain Assessment  Pain Assessment: 0-10  0-10 (Numeric) Pain Score: 0 - No pain    Objective   Cognition:  Overall Cognitive Status: Impaired at baseline  Orientation Level: Disoriented to place, Disoriented to time, Disoriented to situation (cues for )  Following Commands:  (pt follows ~75% commands with max verbal/tactile cues and repeitition)  Attention:  (easily distractible throughout)  Memory: Exceptions to WFL  Long-Term Memory: Impaired  Short-Term Memory: Impaired  Working Memory: Impaired  Insight: Severe  Impulsive: Moderately  Processing Speed: Delayed           Home Living:  Type of Home: Memory Care unit  Home Living Comments: pt is a poor historian, unable to provide information re: set up at memory care unit  Prior Function:  Prior Function Comments: pt is a poor historian, unable to provide any information re: PLOF       ADL:  Grooming Assistance: Stand by  Grooming Deficit: Wash/dry face, Verbal cueing, Supervision/safety, Increased time to complete (cues to initiate)  LE Dressing Assistance: Moderate  LE Dressing Deficit: Don/doff R sock, Don/doff L sock (pt continues to doff socks despite cues to don, pt demos ability to reach B feet and complete task, able to doff B socks without assist; pt able to manage brief up over hips with assist for balance)  ADL Comments: max cues for sequencing adn initiation d/t impaired cognition  Activity Tolerance:  Endurance: Endurance does not limit participation in activity  Bed Mobility/Transfers: Bed  Mobility  Bed Mobility: Yes  Bed Mobility 1  Bed Mobility 1: Supine to sitting, Sitting to supine, Scooting  Level of Assistance 1: Close supervision, Minimal verbal cues  Bed Mobility Comments 1: cues for sequencing and initiation    Transfers  Transfer: Yes  Transfer 1  Technique 1: Sit to stand, Stand to sit  Transfer Device 1: Gait belt  Transfer Level of Assistance 1: Minimum assistance, +2, Minimal verbal cues, Minimal tactile cues  Trials/Comments 1: from EOB, cues for sequencing & safe hand placement  Transfers 2  Technique 2: Sit to stand, Stand to sit  Transfer Device 2: Gait belt, Walker  Transfer Level of Assistance 2: Minimum assistance, +2, Moderate verbal cues, Moderate tactile cues  Trials/Comments 2: cues for sequencing, ssafe hand placement and walker safety, pt pulling up on walker despite cues      Functional Mobility:  Functional Mobility  Functional Mobility Performed: Yes  Functional Mobility 1  Comments 1: Pt functionally navigated x min household distance in room with bilateral HHA with Min A x2, progressed to CGA x1 using FWW. Cues for sequencing and walker safety, no overt LOB noted.  Sitting Balance:  Static Sitting Balance  Static Sitting-Balance Support: Bilateral upper extremity supported, Feet supported  Static Sitting-Level of Assistance: Close supervision  Static Sitting-Comment/Number of Minutes: at EOB  Dynamic Sitting Balance  Dynamic Sitting-Comments: SBA at EOB  Standing Balance:  Static Standing Balance  Static Standing-Balance Support: Bilateral upper extremity supported  Static Standing-Level of Assistance: Contact guard  Static Standing-Comment/Number of Minutes: FWW  Dynamic Standing Balance  Dynamic Standing-Comments: Min A        Strength:  Strength Comments: B shoulders grossly 3-/5, distally 3/5 based on function  Perception:  Inattention/Neglect: Appears intact       Extremities: RUE   RUE : Exceptions to WFL (shoulder flexion ~90 degrees AROM, distally WFL) and LUE    LUE: Exceptions to WFL (shoulder flexion ~90 degrees AROM, distally WFL)      Outcome Measures:[facility] Daily Activity  Putting on and taking off regular lower body clothing: A lot  Bathing (including washing, rinsing, drying): A little  Putting on and taking off regular upper body clothing: A little  Toileting, which includes using toilet, bedpan or urinal: A little  Taking care of personal grooming such as brushing teeth: A little  Eating Meals: A little  Daily Activity - Total Score: 17        Education Documentation  Body Mechanics, taught by Doreen Ghosh OT at 2/20/2025  1:38 PM.  Learner: Patient  Readiness: Acceptance  Method: Explanation  Response: No Evidence of Learning    Precautions, taught by Doreen Ghosh OT at 2/20/2025  1:38 PM.  Learner: Patient  Readiness: Acceptance  Method: Explanation  Response: No Evidence of Learning    ADL Training, taught by Doreen Ghosh OT at 2/20/2025  1:38 PM.  Learner: Patient  Readiness: Acceptance  Method: Explanation  Response: No Evidence of Learning    Education Comments  No comments found.

## 2025-02-20 NOTE — PROGRESS NOTES
02/20/25 1509   Discharge Planning   Support Systems Children   Assistance Needed updated PT/OT notes, IM clinicala H/P faxed to [facility] Mehreen Fengy  for review-to return back to Memory Care AL; per IM chart review;Pending neuro clearance  DC planning   Type of Residence Assisted living   [facility]   Expected Discharge Disposition Home   Does the patient need discharge transport arranged? Yes   RoundTrip coordination needed? Yes   Has discharge transport been arranged? No

## 2025-02-20 NOTE — CARE PLAN
The patient's goals for the shift include      The clinical goals for the shift include patient will remain stable    Over the shift, the patient did not make progress toward the following goals. Barriers to progression include . Recommendations to address these barriers include .  Problem: Fall/Injury  Goal: Not fall by end of shift  Outcome: Progressing

## 2025-02-21 VITALS
BODY MASS INDEX: 28.16 KG/M2 | SYSTOLIC BLOOD PRESSURE: 118 MMHG | RESPIRATION RATE: 18 BRPM | WEIGHT: 153 LBS | HEIGHT: 62 IN | TEMPERATURE: 97.7 F | OXYGEN SATURATION: 99 % | DIASTOLIC BLOOD PRESSURE: 76 MMHG | HEART RATE: 64 BPM

## 2025-02-21 LAB
ANION GAP SERPL CALC-SCNC: 11 MMOL/L (ref 10–20)
BASOPHILS # BLD AUTO: 0.02 X10*3/UL (ref 0–0.1)
BASOPHILS NFR BLD AUTO: 0.4 %
BUN SERPL-MCNC: 17 MG/DL (ref 6–23)
CALCIUM SERPL-MCNC: 8.2 MG/DL (ref 8.6–10.3)
CHLORIDE SERPL-SCNC: 106 MMOL/L (ref 98–107)
CO2 SERPL-SCNC: 24 MMOL/L (ref 21–32)
CREAT SERPL-MCNC: 0.78 MG/DL (ref 0.5–1.05)
EGFRCR SERPLBLD CKD-EPI 2021: 78 ML/MIN/1.73M*2
EOSINOPHIL # BLD AUTO: 0.11 X10*3/UL (ref 0–0.4)
EOSINOPHIL NFR BLD AUTO: 2.3 %
ERYTHROCYTE [DISTWIDTH] IN BLOOD BY AUTOMATED COUNT: 15.1 % (ref 11.5–14.5)
GLUCOSE SERPL-MCNC: 103 MG/DL (ref 74–99)
HCT VFR BLD AUTO: 35.6 % (ref 36–46)
HGB BLD-MCNC: 11.6 G/DL (ref 12–16)
IMM GRANULOCYTES # BLD AUTO: 0.02 X10*3/UL (ref 0–0.5)
IMM GRANULOCYTES NFR BLD AUTO: 0.4 % (ref 0–0.9)
LYMPHOCYTES # BLD AUTO: 1.75 X10*3/UL (ref 0.8–3)
LYMPHOCYTES NFR BLD AUTO: 36.9 %
MCH RBC QN AUTO: 26.7 PG (ref 26–34)
MCHC RBC AUTO-ENTMCNC: 32.6 G/DL (ref 32–36)
MCV RBC AUTO: 82 FL (ref 80–100)
MONOCYTES # BLD AUTO: 0.47 X10*3/UL (ref 0.05–0.8)
MONOCYTES NFR BLD AUTO: 9.9 %
NEUTROPHILS # BLD AUTO: 2.37 X10*3/UL (ref 1.6–5.5)
NEUTROPHILS NFR BLD AUTO: 50.1 %
NRBC BLD-RTO: 0 /100 WBCS (ref 0–0)
PLATELET # BLD AUTO: 224 X10*3/UL (ref 150–450)
POTASSIUM SERPL-SCNC: 3.7 MMOL/L (ref 3.5–5.3)
RBC # BLD AUTO: 4.35 X10*6/UL (ref 4–5.2)
SODIUM SERPL-SCNC: 137 MMOL/L (ref 136–145)
WBC # BLD AUTO: 4.7 X10*3/UL (ref 4.4–11.3)

## 2025-02-21 PROCEDURE — 80048 BASIC METABOLIC PNL TOTAL CA: CPT | Performed by: NURSE PRACTITIONER

## 2025-02-21 PROCEDURE — 2500000001 HC RX 250 WO HCPCS SELF ADMINISTERED DRUGS (ALT 637 FOR MEDICARE OP): Performed by: NURSE PRACTITIONER

## 2025-02-21 PROCEDURE — 36415 COLL VENOUS BLD VENIPUNCTURE: CPT | Performed by: NURSE PRACTITIONER

## 2025-02-21 PROCEDURE — 85025 COMPLETE CBC W/AUTO DIFF WBC: CPT | Performed by: NURSE PRACTITIONER

## 2025-02-21 RX ORDER — LACOSAMIDE 100 MG/1
100 TABLET ORAL EVERY 12 HOURS SCHEDULED
Start: 2025-02-21 | End: 2025-03-23

## 2025-02-21 RX ADMIN — DOCUSATE SODIUM 100 MG: 100 CAPSULE, LIQUID FILLED ORAL at 09:14

## 2025-02-21 RX ADMIN — LACOSAMIDE 100 MG: 50 TABLET, FILM COATED ORAL at 09:14

## 2025-02-21 RX ADMIN — PANTOPRAZOLE SODIUM 40 MG: 40 TABLET, DELAYED RELEASE ORAL at 06:26

## 2025-02-21 NOTE — CARE PLAN
The patient's goals for the shift include      The clinical goals for the shift include patient will remain  safe    Over the shift, the patient did not make progress toward the following goals. Barriers to progression include . Recommendations to address these barriers include .  Problem: Pain - Adult  Goal: Verbalizes/displays adequate comfort level or baseline comfort level  Outcome: Progressing     Problem: Safety - Adult  Goal: Free from fall injury  Outcome: Progressing

## 2025-02-21 NOTE — PROGRESS NOTES
Elizabeth Garcia is a 79 y.o. female on day 3 of admission presenting with Seizure (Multi).      Subjective     Pt more awake and alert  Plesaant  Able to answer some simple questions  No ac issues no seizure per nurse      Objective     Last Recorded Vitals  /76 (BP Location: Right arm, Patient Position: Lying)   Pulse 64   Temp 36.5 °C (97.7 °F) (Temporal)   Resp 18   Wt 69.4 kg (153 lb)   SpO2 99%   Intake/Output last 3 Shifts:  No intake or output data in the 24 hours ending 02/21/25 0944      Admission Weight  Weight: 69.4 kg (153 lb) (02/18/25 0717)    Daily Weight  02/18/25 : 69.4 kg (153 lb)    Image Results  ECG 12 lead  Sinus rhythm with 1st degree AV block  Otherwise normal ECG  When compared with ECG of 18-FEB-2025 07:47, (unconfirmed)  Sinus rhythm has replaced Junctional rhythm  ST elevation now present in Inferior leads      Physical Exam    Constitutional:       Appearance: Normal appearance.   Cardiovascular:      Rate and Rhythm: Normal rate and regular rhythm.      Pulses: Normal pulses.      Heart sounds: Normal heart sounds. No murmur heard.     No gallop.   Pulmonary:      Effort: Pulmonary effort is normal. No respiratory distress.      Breath sounds: Normal breath sounds. No wheezing or rhonchi.   Abdominal:      General: Abdomen is flat. Bowel sounds are normal. There is no distension.      Palpations: Abdomen is soft.      Tenderness: There is no abdominal tenderness. There is no guarding.   Skin:     General: Skin is warm.      Capillary Refill: Capillary refill takes less than 2 seconds.   Neurological:      Mental Status: She is alert. She is disoriented.    Psychiatric:         Cognition and Memory: Cognition is impaired. Memory is impaired.   Relevant Results    Scheduled medications  docusate sodium, 100 mg, oral, BID  enoxaparin, 40 mg, subcutaneous, q24h  lacosamide, 100 mg, oral, q12h COSTA  pantoprazole, 40 mg, oral, Daily before breakfast      Continuous medications      PRN medications  PRN medications: acetaminophen **OR** acetaminophen **OR** acetaminophen, ondansetron **OR** ondansetron, polyethylene glycol  Results for orders placed or performed during the hospital encounter of 02/18/25 (from the past 96 hours)   CBC and Auto Differential   Result Value Ref Range    WBC 3.8 (L) 4.4 - 11.3 x10*3/uL    nRBC 0.0 0.0 - 0.0 /100 WBCs    RBC 4.72 4.00 - 5.20 x10*6/uL    Hemoglobin 12.7 12.0 - 16.0 g/dL    Hematocrit 39.5 36.0 - 46.0 %    MCV 84 80 - 100 fL    MCH 26.9 26.0 - 34.0 pg    MCHC 32.2 32.0 - 36.0 g/dL    RDW 15.0 (H) 11.5 - 14.5 %    Platelets 209 150 - 450 x10*3/uL    Neutrophils % 53.6 40.0 - 80.0 %    Immature Granulocytes %, Automated 0.3 0.0 - 0.9 %    Lymphocytes % 37.2 13.0 - 44.0 %    Monocytes % 5.5 2.0 - 10.0 %    Eosinophils % 2.9 0.0 - 6.0 %    Basophils % 0.5 0.0 - 2.0 %    Neutrophils Absolute 2.03 1.60 - 5.50 x10*3/uL    Immature Granulocytes Absolute, Automated 0.01 0.00 - 0.50 x10*3/uL    Lymphocytes Absolute 1.41 0.80 - 3.00 x10*3/uL    Monocytes Absolute 0.21 0.05 - 0.80 x10*3/uL    Eosinophils Absolute 0.11 0.00 - 0.40 x10*3/uL    Basophils Absolute 0.02 0.00 - 0.10 x10*3/uL   Comprehensive metabolic panel   Result Value Ref Range    Glucose 83 74 - 99 mg/dL    Sodium 141 136 - 145 mmol/L    Potassium 4.2 3.5 - 5.3 mmol/L    Chloride 108 (H) 98 - 107 mmol/L    Bicarbonate 26 21 - 32 mmol/L    Anion Gap 11 10 - 20 mmol/L    Urea Nitrogen 18 6 - 23 mg/dL    Creatinine 0.81 0.50 - 1.05 mg/dL    eGFR 74 >60 mL/min/1.73m*2    Calcium 8.7 8.6 - 10.3 mg/dL    Albumin 3.7 3.4 - 5.0 g/dL    Alkaline Phosphatase 75 33 - 136 U/L    Total Protein 6.8 6.4 - 8.2 g/dL    AST 17 9 - 39 U/L    Bilirubin, Total 0.4 0.0 - 1.2 mg/dL    ALT 9 7 - 45 U/L   Magnesium   Result Value Ref Range    Magnesium 1.97 1.60 - 2.40 mg/dL   Lactate   Result Value Ref Range    Lactate 2.5 (H) 0.4 - 2.0 mmol/L   ECG 12 lead   Result Value Ref Range    Ventricular Rate 68 BPM    QRS  Duration 64 ms    QT Interval 398 ms    QTC Calculation(Bazett) 423 ms    R Axis 9 degrees    T Axis 21 degrees    QRS Count 11 beats    Q Onset 222 ms    P Onset 120 ms    P Offset 153 ms    T Offset 421 ms    QTC Fredericia 415 ms   Lactate   Result Value Ref Range    Lactate 0.8 0.4 - 2.0 mmol/L   ECG 12 lead   Result Value Ref Range    Ventricular Rate 82 BPM    Atrial Rate 82 BPM    MN Interval 210 ms    QRS Duration 76 ms    QT Interval 388 ms    QTC Calculation(Bazett) 453 ms    P Axis 33 degrees    R Axis 22 degrees    T Axis 63 degrees    QRS Count 14 beats    Q Onset 216 ms    P Onset 111 ms    P Offset 165 ms    T Offset 410 ms    QTC Fredericia 430 ms   Basic metabolic panel   Result Value Ref Range    Glucose 88 74 - 99 mg/dL    Sodium 139 136 - 145 mmol/L    Potassium 3.6 3.5 - 5.3 mmol/L    Chloride 107 98 - 107 mmol/L    Bicarbonate 25 21 - 32 mmol/L    Anion Gap 11 10 - 20 mmol/L    Urea Nitrogen 13 6 - 23 mg/dL    Creatinine 0.76 0.50 - 1.05 mg/dL    eGFR 80 >60 mL/min/1.73m*2    Calcium 8.3 (L) 8.6 - 10.3 mg/dL   CBC and Auto Differential   Result Value Ref Range    WBC 5.1 4.4 - 11.3 x10*3/uL    nRBC 0.0 0.0 - 0.0 /100 WBCs    RBC 4.51 4.00 - 5.20 x10*6/uL    Hemoglobin 12.0 12.0 - 16.0 g/dL    Hematocrit 37.3 36.0 - 46.0 %    MCV 83 80 - 100 fL    MCH 26.6 26.0 - 34.0 pg    MCHC 32.2 32.0 - 36.0 g/dL    RDW 14.8 (H) 11.5 - 14.5 %    Platelets 239 150 - 450 x10*3/uL    Neutrophils % 72.5 40.0 - 80.0 %    Immature Granulocytes %, Automated 0.4 0.0 - 0.9 %    Lymphocytes % 19.8 13.0 - 44.0 %    Monocytes % 6.3 2.0 - 10.0 %    Eosinophils % 0.8 0.0 - 6.0 %    Basophils % 0.2 0.0 - 2.0 %    Neutrophils Absolute 3.69 1.60 - 5.50 x10*3/uL    Immature Granulocytes Absolute, Automated 0.02 0.00 - 0.50 x10*3/uL    Lymphocytes Absolute 1.01 0.80 - 3.00 x10*3/uL    Monocytes Absolute 0.32 0.05 - 0.80 x10*3/uL    Eosinophils Absolute 0.04 0.00 - 0.40 x10*3/uL    Basophils Absolute 0.01 0.00 - 0.10 x10*3/uL    Basic metabolic panel   Result Value Ref Range    Glucose 90 74 - 99 mg/dL    Sodium 139 136 - 145 mmol/L    Potassium 3.7 3.5 - 5.3 mmol/L    Chloride 108 (H) 98 - 107 mmol/L    Bicarbonate 23 21 - 32 mmol/L    Anion Gap 12 10 - 20 mmol/L    Urea Nitrogen 17 6 - 23 mg/dL    Creatinine 0.88 0.50 - 1.05 mg/dL    eGFR 67 >60 mL/min/1.73m*2    Calcium 8.3 (L) 8.6 - 10.3 mg/dL   CBC and Auto Differential   Result Value Ref Range    WBC 5.3 4.4 - 11.3 x10*3/uL    nRBC 0.0 0.0 - 0.0 /100 WBCs    RBC 4.66 4.00 - 5.20 x10*6/uL    Hemoglobin 12.1 12.0 - 16.0 g/dL    Hematocrit 40.2 36.0 - 46.0 %    MCV 86 80 - 100 fL    MCH 26.0 26.0 - 34.0 pg    MCHC 30.1 (L) 32.0 - 36.0 g/dL    RDW 15.1 (H) 11.5 - 14.5 %    Platelets 214 150 - 450 x10*3/uL    Neutrophils % 56.8 40.0 - 80.0 %    Immature Granulocytes %, Automated 0.4 0.0 - 0.9 %    Lymphocytes % 32.3 13.0 - 44.0 %    Monocytes % 8.6 2.0 - 10.0 %    Eosinophils % 1.5 0.0 - 6.0 %    Basophils % 0.4 0.0 - 2.0 %    Neutrophils Absolute 3.02 1.60 - 5.50 x10*3/uL    Immature Granulocytes Absolute, Automated 0.02 0.00 - 0.50 x10*3/uL    Lymphocytes Absolute 1.72 0.80 - 3.00 x10*3/uL    Monocytes Absolute 0.46 0.05 - 0.80 x10*3/uL    Eosinophils Absolute 0.08 0.00 - 0.40 x10*3/uL    Basophils Absolute 0.02 0.00 - 0.10 x10*3/uL   Basic metabolic panel   Result Value Ref Range    Glucose 103 (H) 74 - 99 mg/dL    Sodium 137 136 - 145 mmol/L    Potassium 3.7 3.5 - 5.3 mmol/L    Chloride 106 98 - 107 mmol/L    Bicarbonate 24 21 - 32 mmol/L    Anion Gap 11 10 - 20 mmol/L    Urea Nitrogen 17 6 - 23 mg/dL    Creatinine 0.78 0.50 - 1.05 mg/dL    eGFR 78 >60 mL/min/1.73m*2    Calcium 8.2 (L) 8.6 - 10.3 mg/dL   CBC and Auto Differential   Result Value Ref Range    WBC 4.7 4.4 - 11.3 x10*3/uL    nRBC 0.0 0.0 - 0.0 /100 WBCs    RBC 4.35 4.00 - 5.20 x10*6/uL    Hemoglobin 11.6 (L) 12.0 - 16.0 g/dL    Hematocrit 35.6 (L) 36.0 - 46.0 %    MCV 82 80 - 100 fL    MCH 26.7 26.0 - 34.0 pg    MCHC  32.6 32.0 - 36.0 g/dL    RDW 15.1 (H) 11.5 - 14.5 %    Platelets 224 150 - 450 x10*3/uL    Neutrophils % 50.1 40.0 - 80.0 %    Immature Granulocytes %, Automated 0.4 0.0 - 0.9 %    Lymphocytes % 36.9 13.0 - 44.0 %    Monocytes % 9.9 2.0 - 10.0 %    Eosinophils % 2.3 0.0 - 6.0 %    Basophils % 0.4 0.0 - 2.0 %    Neutrophils Absolute 2.37 1.60 - 5.50 x10*3/uL    Immature Granulocytes Absolute, Automated 0.02 0.00 - 0.50 x10*3/uL    Lymphocytes Absolute 1.75 0.80 - 3.00 x10*3/uL    Monocytes Absolute 0.47 0.05 - 0.80 x10*3/uL    Eosinophils Absolute 0.11 0.00 - 0.40 x10*3/uL    Basophils Absolute 0.02 0.00 - 0.10 x10*3/uL              No current facility-administered medications on file prior to encounter.     Current Outpatient Medications on File Prior to Encounter   Medication Sig Dispense Refill    cetirizine (ZyrTEC) 10 mg tablet Take 1 tablet (10 mg) by mouth once daily as needed for allergies.      emollient combination no.110 (Eucerin Intensive Repair) lotion Apply 1 Application topically once daily. To face      guaiFENesin (Robitussin) 100 mg/5 mL syrup Take 10 mL (200 mg) by mouth every 4 hours if needed for cough.      loperamide (Imodium A-D) 2 mg tablet Take 1 tablet (2 mg) by mouth 4 times a day as needed for diarrhea.      menthol (BenGay Vanishing Scent) 2.5 % gel Apply topically. APPLY TOPICALLY TO LOWER BACK 3 TIMES DAILY AS NEEDED      ondansetron ODT (Zofran-ODT) 4 mg disintegrating tablet Dissolve 1 tablet (4 mg) in the mouth 3 times a day as needed for nausea or vomiting.      [DISCONTINUED] methyl salicylate/menthol (ICY HOT TOP) Apply 1 Application topically 3 times a day as needed (for lower back pain). (Patient not taking: Reported on 2/18/2025)        Results for orders placed or performed during the hospital encounter of 02/18/25 (from the past 24 hours)   Basic metabolic panel   Result Value Ref Range    Glucose 103 (H) 74 - 99 mg/dL    Sodium 137 136 - 145 mmol/L    Potassium 3.7 3.5 -  5.3 mmol/L    Chloride 106 98 - 107 mmol/L    Bicarbonate 24 21 - 32 mmol/L    Anion Gap 11 10 - 20 mmol/L    Urea Nitrogen 17 6 - 23 mg/dL    Creatinine 0.78 0.50 - 1.05 mg/dL    eGFR 78 >60 mL/min/1.73m*2    Calcium 8.2 (L) 8.6 - 10.3 mg/dL   CBC and Auto Differential   Result Value Ref Range    WBC 4.7 4.4 - 11.3 x10*3/uL    nRBC 0.0 0.0 - 0.0 /100 WBCs    RBC 4.35 4.00 - 5.20 x10*6/uL    Hemoglobin 11.6 (L) 12.0 - 16.0 g/dL    Hematocrit 35.6 (L) 36.0 - 46.0 %    MCV 82 80 - 100 fL    MCH 26.7 26.0 - 34.0 pg    MCHC 32.6 32.0 - 36.0 g/dL    RDW 15.1 (H) 11.5 - 14.5 %    Platelets 224 150 - 450 x10*3/uL    Neutrophils % 50.1 40.0 - 80.0 %    Immature Granulocytes %, Automated 0.4 0.0 - 0.9 %    Lymphocytes % 36.9 13.0 - 44.0 %    Monocytes % 9.9 2.0 - 10.0 %    Eosinophils % 2.3 0.0 - 6.0 %    Basophils % 0.4 0.0 - 2.0 %    Neutrophils Absolute 2.37 1.60 - 5.50 x10*3/uL    Immature Granulocytes Absolute, Automated 0.02 0.00 - 0.50 x10*3/uL    Lymphocytes Absolute 1.75 0.80 - 3.00 x10*3/uL    Monocytes Absolute 0.47 0.05 - 0.80 x10*3/uL    Eosinophils Absolute 0.11 0.00 - 0.40 x10*3/uL    Basophils Absolute 0.02 0.00 - 0.10 x10*3/uL           Assessment/Plan         Assessment & Plan  Seizure (Multi)      Seizure like activity >> none further reported   - neuro consulted, appreciate recs >> c/w current regimen   -seizure precautions          Dementia  -supportive care     DVT prophylaxis:  Lovenox, SCD     DC plan:  On going   Back to memory care    -Continue current treatment as ordered. Will make adjustments as necessary.    VTE Prophylaxis  -See Orders    PTOT     Gianni Jeffers MD

## 2025-02-21 NOTE — DISCHARGE INSTRUCTIONS
Addended by: KELSEA NUR on: 6/24/2021 10:40 AM     Modules accepted: Orders     Please follow up with Neurologist

## 2025-02-21 NOTE — PROGRESS NOTES
02/21/25 1151   Discharge Planning   Support Systems Children   Assistance Needed TCC updated by Attending, patient is medically ready for discharge; TCC spoke to DON, okay for patient's return before 5pm; patient's son updated about discharge plan he will provide transport  back to AL/Memorycare unit; nursing report number provided to nurse   Expected Discharge Disposition Home  ([facility] AL/Memory Care)   Does the patient need discharge transport arranged? No  (patient's son will provide transport home)

## 2025-02-25 NOTE — DISCHARGE SUMMARY
Discharge Diagnosis  Seizure (Multi)    Issues Requiring Follow-Up  Continue to monitor for seizures    Discharge Meds     Medication List      START taking these medications     lacosamide 100 mg tablet; Commonly known as: Vimpat; Take 1 tablet (100   mg) by mouth every 12 hours.     CONTINUE taking these medications     cetirizine 10 mg tablet; Commonly known as: ZyrTEC   Eucerin Intensive Repair lotion; Generic drug: emollient combination   no.110   loperamide 2 mg tablet; Commonly known as: Imodium A-D   ondansetron ODT 4 mg disintegrating tablet; Commonly known as:   Zofran-ODT     STOP taking these medications     BenGay Vanishing Scent 2.5 % gel; Generic drug: menthol   guaiFENesin 100 mg/5 mL syrup; Commonly known as: Robitussin       Test Results Pending At Discharge  Pending Labs       No current pending labs.            Hospital Course  Seizure like activity.   She was seen by neurology here 5/3/24 for seizure like activity but EEG was negative, just showed moderate to severe encephalopathy. She was not recommended to start AED at that time. She also was admitted to [facility] 10/2024 for seizure like activity as well. Per neuro note at that time it seems that provider was told there was no witnessed seizure like activity. EEG was also negative at that time as well.       PMHX: dementia, seizure like activity     Patient did have witnessed episode of seizure when she was in the emergency room.  Hence the patient has been started on Vimpat and has been tolerating it she is going to be discharged on seizure medication    Pertinent Physical Exam At Time of Discharge  Physical Exam    Outpatient Follow-Up  No future appointments.      Gianni Jeffers MD

## 2025-02-28 LAB
ATRIAL RATE: 82 BPM
P AXIS: 33 DEGREES
P OFFSET: 153 MS
P OFFSET: 165 MS
P ONSET: 111 MS
P ONSET: 120 MS
PR INTERVAL: 210 MS
Q ONSET: 216 MS
Q ONSET: 222 MS
QRS COUNT: 11 BEATS
QRS COUNT: 14 BEATS
QRS DURATION: 64 MS
QRS DURATION: 76 MS
QT INTERVAL: 388 MS
QT INTERVAL: 398 MS
QTC CALCULATION(BAZETT): 423 MS
QTC CALCULATION(BAZETT): 453 MS
QTC FREDERICIA: 415 MS
QTC FREDERICIA: 430 MS
R AXIS: 22 DEGREES
R AXIS: 9 DEGREES
T AXIS: 21 DEGREES
T AXIS: 63 DEGREES
T OFFSET: 410 MS
T OFFSET: 421 MS
VENTRICULAR RATE: 68 BPM
VENTRICULAR RATE: 82 BPM

## 2025-03-18 ENCOUNTER — HOSPITAL ENCOUNTER (EMERGENCY)
Facility: HOSPITAL | Age: 80
Discharge: HOME | End: 2025-03-18
Attending: GENERAL PRACTICE
Payer: MEDICARE

## 2025-03-18 ENCOUNTER — APPOINTMENT (OUTPATIENT)
Dept: RADIOLOGY | Facility: HOSPITAL | Age: 80
End: 2025-03-18
Payer: MEDICARE

## 2025-03-18 VITALS
SYSTOLIC BLOOD PRESSURE: 117 MMHG | TEMPERATURE: 97.5 F | HEART RATE: 61 BPM | OXYGEN SATURATION: 100 % | RESPIRATION RATE: 18 BRPM | DIASTOLIC BLOOD PRESSURE: 74 MMHG
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
## 2025-03-18 PROCEDURE — 72125 CT NECK SPINE W/O DYE: CPT | Performed by: RADIOLOGY

## 2025-03-18 PROCEDURE — 70450 CT HEAD/BRAIN W/O DYE: CPT

## 2025-03-18 PROCEDURE — 72125 CT NECK SPINE W/O DYE: CPT

## 2025-03-18 PROCEDURE — 99284 EMERGENCY DEPT VISIT MOD MDM: CPT | Mod: 25 | Performed by: GENERAL PRACTICE

## 2025-03-18 ASSESSMENT — COLUMBIA-SUICIDE SEVERITY RATING SCALE - C-SSRS
2. HAVE YOU ACTUALLY HAD ANY THOUGHTS OF KILLING YOURSELF?: NO
6. HAVE YOU EVER DONE ANYTHING, STARTED TO DO ANYTHING, OR PREPARED TO DO ANYTHING TO END YOUR LIFE?: NO
1. IN THE PAST MONTH, HAVE YOU WISHED YOU WERE DEAD OR WISHED YOU COULD GO TO SLEEP AND NOT WAKE UP?: NO
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
## 2025-03-21 ENCOUNTER — APPOINTMENT (OUTPATIENT)
Dept: CARDIOLOGY | Facility: HOSPITAL | Age: 80
End: 2025-03-21
Payer: MEDICARE

## 2025-03-21 ENCOUNTER — APPOINTMENT (OUTPATIENT)
Dept: RADIOLOGY | Facility: HOSPITAL | Age: 80
End: 2025-03-21
Payer: MEDICARE

## 2025-03-21 ENCOUNTER — HOSPITAL ENCOUNTER (EMERGENCY)
Facility: HOSPITAL | Age: 80
Discharge: HOME | End: 2025-03-21
Attending: EMERGENCY MEDICINE
Payer: MEDICARE

## 2025-03-21 VITALS
WEIGHT: 153 LBS | HEART RATE: 86 BPM | HEIGHT: 62 IN | BODY MASS INDEX: 28.16 KG/M2 | SYSTOLIC BLOOD PRESSURE: 126 MMHG | DIASTOLIC BLOOD PRESSURE: 95 MMHG | TEMPERATURE: 97.7 F | OXYGEN SATURATION: 100 % | RESPIRATION RATE: 18 BRPM

## 2025-03-21 DIAGNOSIS — F03.90 DEMENTIA, UNSPECIFIED DEMENTIA SEVERITY, UNSPECIFIED DEMENTIA TYPE, UNSPECIFIED WHETHER BEHAVIORAL, PSYCHOTIC, OR MOOD DISTURBANCE OR ANXIETY (MULTI): Primary | ICD-10-CM

## 2025-03-21 LAB
ALBUMIN SERPL BCP-MCNC: 3.8 G/DL (ref 3.4–5)
ALP SERPL-CCNC: 84 U/L (ref 33–136)
ALT SERPL W P-5'-P-CCNC: 10 U/L (ref 7–45)
ANION GAP BLDV CALCULATED.4IONS-SCNC: 5 MMOL/L (ref 10–25)
ANION GAP SERPL CALC-SCNC: 12 MMOL/L (ref 10–20)
APPEARANCE UR: CLEAR
AST SERPL W P-5'-P-CCNC: 24 U/L (ref 9–39)
BASE EXCESS BLDV CALC-SCNC: 4.3 MMOL/L (ref -2–3)
BASOPHILS # BLD AUTO: 0.01 X10*3/UL (ref 0–0.1)
BASOPHILS NFR BLD AUTO: 0.2 %
BILIRUB DIRECT SERPL-MCNC: 0.1 MG/DL (ref 0–0.3)
BILIRUB SERPL-MCNC: 0.5 MG/DL (ref 0–1.2)
BILIRUB UR STRIP.AUTO-MCNC: NEGATIVE MG/DL
BODY TEMPERATURE: 37 DEGREES CELSIUS
BUN SERPL-MCNC: 26 MG/DL (ref 6–23)
CA-I BLDV-SCNC: 1.21 MMOL/L (ref 1.1–1.33)
CALCIUM SERPL-MCNC: 9.2 MG/DL (ref 8.6–10.3)
CARDIAC TROPONIN I PNL SERPL HS: 8 NG/L (ref 0–13)
CHLORIDE BLDV-SCNC: 108 MMOL/L (ref 98–107)
CHLORIDE SERPL-SCNC: 108 MMOL/L (ref 98–107)
CO2 SERPL-SCNC: 25 MMOL/L (ref 21–32)
COLOR UR: ABNORMAL
CREAT SERPL-MCNC: 0.77 MG/DL (ref 0.5–1.05)
EGFRCR SERPLBLD CKD-EPI 2021: 79 ML/MIN/1.73M*2
EOSINOPHIL # BLD AUTO: 0.07 X10*3/UL (ref 0–0.4)
EOSINOPHIL NFR BLD AUTO: 1.1 %
ERYTHROCYTE [DISTWIDTH] IN BLOOD BY AUTOMATED COUNT: 15.5 % (ref 11.5–14.5)
FLUAV RNA RESP QL NAA+PROBE: NOT DETECTED
FLUBV RNA RESP QL NAA+PROBE: NOT DETECTED
GLUCOSE BLDV-MCNC: 103 MG/DL (ref 74–99)
GLUCOSE SERPL-MCNC: 97 MG/DL (ref 74–99)
GLUCOSE UR STRIP.AUTO-MCNC: NORMAL MG/DL
HCO3 BLDV-SCNC: 29.2 MMOL/L (ref 22–26)
HCT VFR BLD AUTO: 38.3 % (ref 36–46)
HCT VFR BLD EST: 37 % (ref 36–46)
HGB BLD-MCNC: 12 G/DL (ref 12–16)
HGB BLDV-MCNC: 12.4 G/DL (ref 12–16)
IMM GRANULOCYTES # BLD AUTO: 0.01 X10*3/UL (ref 0–0.5)
IMM GRANULOCYTES NFR BLD AUTO: 0.2 % (ref 0–0.9)
INHALED O2 CONCENTRATION: 21 %
KETONES UR STRIP.AUTO-MCNC: NEGATIVE MG/DL
LACTATE BLDV-SCNC: 0.9 MMOL/L (ref 0.4–2)
LEUKOCYTE ESTERASE UR QL STRIP.AUTO: ABNORMAL
LYMPHOCYTES # BLD AUTO: 1.12 X10*3/UL (ref 0.8–3)
LYMPHOCYTES NFR BLD AUTO: 17.2 %
MCH RBC QN AUTO: 26.3 PG (ref 26–34)
MCHC RBC AUTO-ENTMCNC: 31.3 G/DL (ref 32–36)
MCV RBC AUTO: 84 FL (ref 80–100)
MONOCYTES # BLD AUTO: 0.47 X10*3/UL (ref 0.05–0.8)
MONOCYTES NFR BLD AUTO: 7.2 %
MUCOUS THREADS #/AREA URNS AUTO: NORMAL /LPF
NEUTROPHILS # BLD AUTO: 4.83 X10*3/UL (ref 1.6–5.5)
NEUTROPHILS NFR BLD AUTO: 74.1 %
NITRITE UR QL STRIP.AUTO: NEGATIVE
NRBC BLD-RTO: 0 /100 WBCS (ref 0–0)
OXYHGB MFR BLDV: 39.4 % (ref 45–75)
PCO2 BLDV: 44 MM HG (ref 41–51)
PH BLDV: 7.43 PH (ref 7.33–7.43)
PH UR STRIP.AUTO: 6 [PH]
PLATELET # BLD AUTO: 211 X10*3/UL (ref 150–450)
PO2 BLDV: 30 MM HG (ref 35–45)
POTASSIUM BLDV-SCNC: 4.2 MMOL/L (ref 3.5–5.3)
POTASSIUM SERPL-SCNC: 4.5 MMOL/L (ref 3.5–5.3)
PROT SERPL-MCNC: 7.3 G/DL (ref 6.4–8.2)
PROT UR STRIP.AUTO-MCNC: NEGATIVE MG/DL
RBC # BLD AUTO: 4.57 X10*6/UL (ref 4–5.2)
RBC # UR STRIP.AUTO: NEGATIVE MG/DL
RBC #/AREA URNS AUTO: NORMAL /HPF
SAO2 % BLDV: 40 % (ref 45–75)
SARS-COV-2 RNA RESP QL NAA+PROBE: NOT DETECTED
SODIUM BLDV-SCNC: 138 MMOL/L (ref 136–145)
SODIUM SERPL-SCNC: 140 MMOL/L (ref 136–145)
SP GR UR STRIP.AUTO: 1.01
SQUAMOUS #/AREA URNS AUTO: NORMAL /HPF
UROBILINOGEN UR STRIP.AUTO-MCNC: NORMAL MG/DL
WBC # BLD AUTO: 6.5 X10*3/UL (ref 4.4–11.3)
WBC #/AREA URNS AUTO: NORMAL /HPF

## 2025-03-21 PROCEDURE — 70450 CT HEAD/BRAIN W/O DYE: CPT

## 2025-03-21 PROCEDURE — 82435 ASSAY OF BLOOD CHLORIDE: CPT | Performed by: EMERGENCY MEDICINE

## 2025-03-21 PROCEDURE — 87086 URINE CULTURE/COLONY COUNT: CPT | Mod: AHULAB | Performed by: EMERGENCY MEDICINE

## 2025-03-21 PROCEDURE — 82248 BILIRUBIN DIRECT: CPT | Performed by: EMERGENCY MEDICINE

## 2025-03-21 PROCEDURE — 87636 SARSCOV2 & INF A&B AMP PRB: CPT | Performed by: EMERGENCY MEDICINE

## 2025-03-21 PROCEDURE — 70450 CT HEAD/BRAIN W/O DYE: CPT | Performed by: RADIOLOGY

## 2025-03-21 PROCEDURE — 99285 EMERGENCY DEPT VISIT HI MDM: CPT | Mod: 25 | Performed by: EMERGENCY MEDICINE

## 2025-03-21 PROCEDURE — 85025 COMPLETE CBC W/AUTO DIFF WBC: CPT | Performed by: EMERGENCY MEDICINE

## 2025-03-21 PROCEDURE — 84484 ASSAY OF TROPONIN QUANT: CPT | Performed by: EMERGENCY MEDICINE

## 2025-03-21 PROCEDURE — 81001 URINALYSIS AUTO W/SCOPE: CPT | Performed by: EMERGENCY MEDICINE

## 2025-03-21 PROCEDURE — 96360 HYDRATION IV INFUSION INIT: CPT

## 2025-03-21 PROCEDURE — 2500000004 HC RX 250 GENERAL PHARMACY W/ HCPCS (ALT 636 FOR OP/ED): Performed by: EMERGENCY MEDICINE

## 2025-03-21 PROCEDURE — 80053 COMPREHEN METABOLIC PANEL: CPT | Performed by: EMERGENCY MEDICINE

## 2025-03-21 PROCEDURE — 96361 HYDRATE IV INFUSION ADD-ON: CPT

## 2025-03-21 PROCEDURE — 93005 ELECTROCARDIOGRAM TRACING: CPT

## 2025-03-21 PROCEDURE — 71045 X-RAY EXAM CHEST 1 VIEW: CPT

## 2025-03-21 PROCEDURE — 36415 COLL VENOUS BLD VENIPUNCTURE: CPT | Performed by: EMERGENCY MEDICINE

## 2025-03-21 RX ADMIN — SODIUM CHLORIDE 500 ML: 9 INJECTION, SOLUTION INTRAVENOUS at 17:18

## 2025-03-21 ASSESSMENT — PAIN SCALES - GENERAL: PAINLEVEL_OUTOF10: 2

## 2025-03-21 ASSESSMENT — PAIN DESCRIPTION - LOCATION: LOCATION: ABDOMEN

## 2025-03-21 ASSESSMENT — COLUMBIA-SUICIDE SEVERITY RATING SCALE - C-SSRS
2. HAVE YOU ACTUALLY HAD ANY THOUGHTS OF KILLING YOURSELF?: NO
1. IN THE PAST MONTH, HAVE YOU WISHED YOU WERE DEAD OR WISHED YOU COULD GO TO SLEEP AND NOT WAKE UP?: NO
6. HAVE YOU EVER DONE ANYTHING, STARTED TO DO ANYTHING, OR PREPARED TO DO ANYTHING TO END YOUR LIFE?: NO

## 2025-03-21 ASSESSMENT — PAIN - FUNCTIONAL ASSESSMENT: PAIN_FUNCTIONAL_ASSESSMENT: 0-10

## 2025-03-21 NOTE — ED PROVIDER NOTES
HPI   Chief Complaint   Patient presents with    Altered Mental Status       HPI: []  79-year-old Afro-American female history of advanced dementia, seizure activity on lacosamide, nursing home resident comes in with altered mental status.  Apparently she was found to be somewhat confused in the nursing home.  Currently back to baseline.  No trauma falls fever chills nausea vomit diarrhea cough congestion incontinence no seizures no hematemesis melena or hematochezia, no hemoptysis    Past history: Dementia, seizure activity  Social: Patient denies current tobacco alcohol drug use.      REVIEW OF SYSTEMS:    GENERAL.: No weight loss, fatigue, anorexia, insomnia, fever.    EYES: No vision loss, double vision, drainage, eye pain.    ENT: No pharyngitis, dry mouth.    CARDIOPULMONARY: No chest pain, palpitations, syncope, near syncope. No shortness of breath, cough, hemoptysis.    GI: No abdominal pain, change in bowel habits, melena, hematemesis, hematochezia, nausea, vomiting, diarrhea.    : No discharge, dysuria, frequency, urgency, hematuria.    MS: No limb pain, joint pain, joint swelling.    SKIN: No rashes.    PSYCH: No depression, anxiety, suicidality, homicidality.    Review of systems is otherwise negative unless stated above or in history of present illness.  Social history, family history, allergies reviewed.  PHYSICAL EXAM:    GENERAL: Vitals noted, no distress. Alert and oriented  x 0 at baseline very pleasant following commands appropriately. Non-toxic.      EENT: TMs clear. Posterior oropharynx unremarkable. No meningismus. No LAD.     NECK: Supple. Nontender. No midline tenderness.     CARDIAC: Regular, rate, rhythm. No murmurs rubs or gallops. No JVD    PULMONARY: Lungs clear bilaterally with good aeration. No wheezes rales or rhonchi. No respiratory distress.     ABDOMEN: Soft, nonsurgical. Nontender. No peritoneal signs. Normoactive bowel sounds. No pulsatile masses.     EXTREMITIES: No peripheral  edema. Negative Homans bilaterally, no cords.  2+ bounding pulses well-perfused.    SKIN: No rash. Intact.     NEURO: No focal neurologic deficits, NIH score of 0. Cranial nerves normal as tested from II through XII.     MEDICAL DECISION MAKING:  EKG on my interpretation shows a normal sinus rhythm normal axis rate mid 70s with no acute ischemic change.  CBC with differential chemistry, LFTs are normal, UA negative CT head negative chest x-ray negative.  Treatment: IV established placed on a cardiac monitor given gentle hydration  ED course: Patient remained stable hemodynamic.  At baseline mental status.  Impression: #1 dementia  Plan set MDM: 79-year-old female history advanced dementia comes in with the altered mental status currently at baseline mental status was alert and awake O x 0/1, her exam is fairly unremarkable workup is negative my suspicion for infection dehydration sepsis septic shock stroke TIA is low.  Patient be discharged back to her facility with supportive care advised and outpatient follow-up with primary doctor with strict return precaution.              Patient History   Past Medical History:   Diagnosis Date    Dementia (Multi)     Personal history of other specified conditions 03/18/2015    History of chest pain     No past surgical history on file.  No family history on file.  Social History     Tobacco Use    Smoking status: Unknown     Passive exposure: Never    Smokeless tobacco: Not on file   Vaping Use    Vaping status: Unknown   Substance Use Topics    Alcohol use: Not on file    Drug use: Not on file       Physical Exam   ED Triage Vitals   Temperature Heart Rate Respirations BP   03/21/25 1620 03/21/25 1620 03/21/25 1620 03/21/25 1620   36.5 °C (97.7 °F) 75 18 128/76      Pulse Ox Temp src Heart Rate Source Patient Position   03/21/25 1928 -- -- --   100 %         BP Location FiO2 (%)     -- --             Physical Exam      ED Course & MDM   ED Course as of 03/21/25 1958   Fri Mar  21, 2025 1944 EKG has no ischemic change.  CT head is negative chest ray negative labs reassuring to baseline UA negative patient based on status of addition back to her facility. [MT]      ED Course User Index  [MT] Diann Glasgow MD         Diagnoses as of 03/21/25 1958   Dementia, unspecified dementia severity, unspecified dementia type, unspecified whether behavioral, psychotic, or mood disturbance or anxiety (Multi)                 No data recorded     [place] Coma Scale Score: 14 (03/21/25 1624 : Nae Tejada RN)                           Medical Decision Making      Procedure  Procedures     Diann Glasgow MD  03/21/25 2000

## 2025-03-21 NOTE — ED TRIAGE NOTES
Pt to ED from [facility] dementia unit for c/o AMS. Per EMS nurse stated pt wasn't acting right. Pt. AO1 per baseline, answering questions appropriately.

## 2025-03-23 LAB — BACTERIA UR CULT: NO GROWTH

## 2025-03-24 LAB
P OFFSET: 160 MS
P ONSET: 138 MS
Q ONSET: 223 MS
QRS COUNT: 12 BEATS
QRS DURATION: 62 MS
QT INTERVAL: 404 MS
QTC CALCULATION(BAZETT): 439 MS
QTC FREDERICIA: 427 MS
R AXIS: 25 DEGREES
T AXIS: 64 DEGREES
T OFFSET: 425 MS
VENTRICULAR RATE: 71 BPM

## 2025-03-29 ENCOUNTER — APPOINTMENT (OUTPATIENT)
Dept: RADIOLOGY | Facility: HOSPITAL | Age: 80
End: 2025-03-29
Payer: MEDICARE

## 2025-03-29 ENCOUNTER — HOSPITAL ENCOUNTER (EMERGENCY)
Facility: HOSPITAL | Age: 80
Discharge: HOME | End: 2025-03-29
Payer: MEDICARE

## 2025-03-29 VITALS
HEART RATE: 72 BPM | BODY MASS INDEX: 28.16 KG/M2 | HEIGHT: 62 IN | TEMPERATURE: 98.4 F | SYSTOLIC BLOOD PRESSURE: 129 MMHG | RESPIRATION RATE: 17 BRPM | DIASTOLIC BLOOD PRESSURE: 63 MMHG | OXYGEN SATURATION: 99 % | WEIGHT: 153 LBS

## 2025-03-29 DIAGNOSIS — S09.90XA CLOSED HEAD INJURY, INITIAL ENCOUNTER: ICD-10-CM
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
## 2025-03-29 DIAGNOSIS — N30.90 CYSTITIS: ICD-10-CM

## 2025-03-29 LAB
ALBUMIN SERPL BCP-MCNC: 3.7 G/DL (ref 3.4–5)
ALP SERPL-CCNC: 79 U/L (ref 33–136)
ALT SERPL W P-5'-P-CCNC: 10 U/L (ref 7–45)
ANION GAP SERPL CALC-SCNC: 12 MMOL/L (ref 10–20)
APPEARANCE UR: CLEAR
AST SERPL W P-5'-P-CCNC: 17 U/L (ref 9–39)
BASOPHILS # BLD AUTO: 0.02 X10*3/UL (ref 0–0.1)
BASOPHILS NFR BLD AUTO: 0.3 %
BILIRUB SERPL-MCNC: 0.3 MG/DL (ref 0–1.2)
BILIRUB UR STRIP.AUTO-MCNC: NEGATIVE MG/DL
BUN SERPL-MCNC: 35 MG/DL (ref 6–23)
CALCIUM SERPL-MCNC: 9 MG/DL (ref 8.6–10.3)
CARDIAC TROPONIN I PNL SERPL HS: 6 NG/L (ref 0–13)
CHLORIDE SERPL-SCNC: 113 MMOL/L (ref 98–107)
CO2 SERPL-SCNC: 24 MMOL/L (ref 21–32)
COLOR UR: YELLOW
CREAT SERPL-MCNC: 0.94 MG/DL (ref 0.5–1.05)
EGFRCR SERPLBLD CKD-EPI 2021: 62 ML/MIN/1.73M*2
EOSINOPHIL # BLD AUTO: 0.11 X10*3/UL (ref 0–0.4)
EOSINOPHIL NFR BLD AUTO: 1.9 %
ERYTHROCYTE [DISTWIDTH] IN BLOOD BY AUTOMATED COUNT: 15.6 % (ref 11.5–14.5)
FLUAV RNA RESP QL NAA+PROBE: NOT DETECTED
FLUBV RNA RESP QL NAA+PROBE: NOT DETECTED
GLUCOSE SERPL-MCNC: 94 MG/DL (ref 74–99)
GLUCOSE UR STRIP.AUTO-MCNC: NORMAL MG/DL
HCT VFR BLD AUTO: 38.2 % (ref 36–46)
HGB BLD-MCNC: 11.8 G/DL (ref 12–16)
IMM GRANULOCYTES # BLD AUTO: 0 X10*3/UL (ref 0–0.5)
IMM GRANULOCYTES NFR BLD AUTO: 0 % (ref 0–0.9)
KETONES UR STRIP.AUTO-MCNC: NEGATIVE MG/DL
LEUKOCYTE ESTERASE UR QL STRIP.AUTO: ABNORMAL
LYMPHOCYTES # BLD AUTO: 1.58 X10*3/UL (ref 0.8–3)
LYMPHOCYTES NFR BLD AUTO: 27.6 %
MCH RBC QN AUTO: 26.6 PG (ref 26–34)
MCHC RBC AUTO-ENTMCNC: 30.9 G/DL (ref 32–36)
MCV RBC AUTO: 86 FL (ref 80–100)
MONOCYTES # BLD AUTO: 0.52 X10*3/UL (ref 0.05–0.8)
MONOCYTES NFR BLD AUTO: 9.1 %
MUCOUS THREADS #/AREA URNS AUTO: ABNORMAL /LPF
NEUTROPHILS # BLD AUTO: 3.5 X10*3/UL (ref 1.6–5.5)
NEUTROPHILS NFR BLD AUTO: 61.1 %
NITRITE UR QL STRIP.AUTO: NEGATIVE
NRBC BLD-RTO: 0 /100 WBCS (ref 0–0)
PH UR STRIP.AUTO: 7 [PH]
PLATELET # BLD AUTO: 261 X10*3/UL (ref 150–450)
POTASSIUM SERPL-SCNC: 4.5 MMOL/L (ref 3.5–5.3)
PROT SERPL-MCNC: 6.8 G/DL (ref 6.4–8.2)
PROT UR STRIP.AUTO-MCNC: ABNORMAL MG/DL
RBC # BLD AUTO: 4.43 X10*6/UL (ref 4–5.2)
RBC # UR STRIP.AUTO: NEGATIVE MG/DL
RBC #/AREA URNS AUTO: ABNORMAL /HPF
RSV RNA RESP QL NAA+PROBE: NOT DETECTED
SARS-COV-2 RNA RESP QL NAA+PROBE: NOT DETECTED
SODIUM SERPL-SCNC: 144 MMOL/L (ref 136–145)
SP GR UR STRIP.AUTO: 1.02
SQUAMOUS #/AREA URNS AUTO: ABNORMAL /HPF
UROBILINOGEN UR STRIP.AUTO-MCNC: NORMAL MG/DL
WBC # BLD AUTO: 5.7 X10*3/UL (ref 4.4–11.3)
WBC #/AREA URNS AUTO: ABNORMAL /HPF

## 2025-03-29 PROCEDURE — 36415 COLL VENOUS BLD VENIPUNCTURE: CPT | Performed by: SURGERY

## 2025-03-29 PROCEDURE — 2500000001 HC RX 250 WO HCPCS SELF ADMINISTERED DRUGS (ALT 637 FOR MEDICARE OP): Performed by: SURGERY

## 2025-03-29 PROCEDURE — 87637 SARSCOV2&INF A&B&RSV AMP PRB: CPT | Performed by: SURGERY

## 2025-03-29 PROCEDURE — 84484 ASSAY OF TROPONIN QUANT: CPT | Performed by: SURGERY

## 2025-03-29 PROCEDURE — 72125 CT NECK SPINE W/O DYE: CPT | Performed by: RADIOLOGY

## 2025-03-29 PROCEDURE — 70450 CT HEAD/BRAIN W/O DYE: CPT | Performed by: RADIOLOGY

## 2025-03-29 PROCEDURE — 72125 CT NECK SPINE W/O DYE: CPT

## 2025-03-29 PROCEDURE — 70450 CT HEAD/BRAIN W/O DYE: CPT

## 2025-03-29 PROCEDURE — 80053 COMPREHEN METABOLIC PANEL: CPT | Performed by: SURGERY

## 2025-03-29 PROCEDURE — 87086 URINE CULTURE/COLONY COUNT: CPT | Mod: AHULAB | Performed by: SURGERY

## 2025-03-29 PROCEDURE — 85025 COMPLETE CBC W/AUTO DIFF WBC: CPT | Performed by: SURGERY

## 2025-03-29 PROCEDURE — 81001 URINALYSIS AUTO W/SCOPE: CPT | Performed by: SURGERY

## 2025-03-29 PROCEDURE — 99284 EMERGENCY DEPT VISIT MOD MDM: CPT | Mod: 25

## 2025-03-29 RX ORDER — CEPHALEXIN 500 MG/1
500 CAPSULE ORAL ONCE
Status: COMPLETED | OUTPATIENT
Start: 2025-03-29 | End: 2025-03-29

## 2025-03-29 RX ORDER — CEPHALEXIN 500 MG/1
500 CAPSULE ORAL 3 TIMES DAILY
Qty: 21 CAPSULE | Refills: 0 | Status: SHIPPED | OUTPATIENT
Start: 2025-03-29 | End: 2025-04-05

## 2025-03-29 RX ADMIN — CEPHALEXIN 500 MG: 500 CAPSULE ORAL at 05:37

## 2025-03-29 ASSESSMENT — PAIN SCALES - GENERAL: PAINLEVEL_OUTOF10: 0 - NO PAIN

## 2025-03-29 ASSESSMENT — PAIN - FUNCTIONAL ASSESSMENT: PAIN_FUNCTIONAL_ASSESSMENT: 0-10

## 2025-03-29 NOTE — DISCHARGE INSTRUCTIONS
You have been seen at a [facility].  Is found that you have a UTI.  Please follow-up with your primary care provider in the next 1 to 2 days for further evaluation and routine follow-up.  Please return to the emergency room if having any worsening symptoms.  Please follow-up with any specialists if discussed during your emergency room stay.

## 2025-03-29 NOTE — ED PROVIDER NOTES
Chief Complaint   Patient presents with    Fall     PER ems CALL WAS FOR FALL PATIENT FELL HITTING HEAD NO LOC NO THINNERS, PATIENT NORMAL STATUS IS ALERT TO 1 AT BASELINE      Limitations to History: dementia  Additional History Obtained from: EMS    HPI:   Elizabeth Garcia is an 79 y.o. female with a history of advanced dementia, seizures presenting from nursing home for a fall.  Apparently the patient fell out of her bed and hit the left side of her head.  She was in the ED a week ago for the same complaint and struck her head in the same area.  She does have a hematoma in this area.  She is ANO x 1 at her baseline.  Not reporting any pain.  Patient appears scared but answers questions.  She reports some dysuria.      No Known Allergies:  Past Medical History:   Diagnosis Date    Dementia (Multi)     Personal history of other specified conditions 03/18/2015    History of chest pain     No past surgical history on file.  No family history on file.     Physical Exam  Vitals and nursing note reviewed.   Constitutional:       General: She is not in acute distress.     Appearance: She is well-developed.   HENT:      Head: Normocephalic and atraumatic.      Right Ear: Tympanic membrane and external ear normal.      Left Ear: Tympanic membrane and external ear normal.      Nose: Nose normal.      Mouth/Throat:      Mouth: Mucous membranes are moist.      Pharynx: Oropharynx is clear.   Eyes:      Extraocular Movements: Extraocular movements intact.      Conjunctiva/sclera: Conjunctivae normal.      Pupils: Pupils are equal, round, and reactive to light.   Cardiovascular:      Rate and Rhythm: Normal rate and regular rhythm.      Heart sounds: No murmur heard.  Pulmonary:      Effort: Pulmonary effort is normal. No respiratory distress.      Breath sounds: Normal breath sounds.   Abdominal:      Palpations: Abdomen is soft.      Tenderness: There is no abdominal tenderness.   Musculoskeletal:         General: No swelling.       Cervical back: Neck supple.   Skin:     General: Skin is warm and dry.      Capillary Refill: Capillary refill takes less than 2 seconds.   Neurological:      Mental Status: She is alert.   Psychiatric:         Mood and Affect: Mood normal.        VS: As documented in the triage note and EMR flowsheet from this visit were reviewed.    External Records Reviewed: I reviewed recent and relevant outside records including: Reviewed ED note from a week ago.  Patient experienced a fall and had a full workup that was unremarkable.    Medical Decision Making: This is 79-year-old female presenting from her nursing home at her mental baseline ANO x 1 for complaint of a fall.  Her nursing home endorse that she fell and hit the left side of her head in the same place that she had it last week.  On exam she does have a small hematoma over her left eyebrow.  The hematoma does not appear new and there is no new bruising or open wounds or sores on this area.  She has no Olmos sign or raccoon eyes.  She has full range of motion of the neck.  She does follow commands and has no focal weaknesses.  No hemotympanums.  CT of her head and neck was unremarkable.  Her blood work did  did not show a leukocytosis.  Her kidney and liver function was normal.  She had no metabolic derangement.  She did have a UTI on urinalysis with 250 leukocytes and 11-20 white blood cells.  She was treated with Keflex in the ED and prescribed this medication for the next 7 days.  She was negative for flu COVID and RSV.  She was discharged back to her facility in stable condition.  Diagnoses as of 03/29/25 0531   Fall, initial encounter   Closed head injury, initial encounter   Cystitis     Counseling: Spoke with the patient and discussed today´s findings, in addition to providing specific details for the plan of care and expected course.  Patient was given the opportunity to ask questions.    Discussed return precautions and importance of follow-up.  Advised  to follow-up with PCP.  I specifically advised to return to the ED for changing or worsening symptoms, new symptoms, complaint specific precautions, and precautions listed on the discharge paperwork.  Educated on the common potential side effects of medications prescribed.    I advised the patient that the emergency evaluation and treatment provided today doesn't end their need for medical care. It is very important that they follow-up with their primary care provider or other specialist as instructed.    The plan of care was mutually agreed upon with the patient. The patient and/or family were given the opportunity to ask questions. All questions asked today in the ED were answered to the best of my ability with today's information.    This report was transcribed using voice recognition software.  Every effort was made to ensure accuracy, however, inadvertently computerized transcription errors may be present.       Timmy Montez PA-C  03/29/25 0601

## 2025-03-31 LAB — BACTERIA UR CULT: NORMAL
